# Patient Record
Sex: MALE | Race: WHITE | Employment: OTHER | URBAN - METROPOLITAN AREA
[De-identification: names, ages, dates, MRNs, and addresses within clinical notes are randomized per-mention and may not be internally consistent; named-entity substitution may affect disease eponyms.]

---

## 2017-02-17 ENCOUNTER — OFFICE VISIT (OUTPATIENT)
Dept: OTOLARYNGOLOGY | Facility: CLINIC | Age: 62
End: 2017-02-17

## 2017-02-17 VITALS — SYSTOLIC BLOOD PRESSURE: 138 MMHG | HEART RATE: 93 BPM | DIASTOLIC BLOOD PRESSURE: 75 MMHG

## 2017-02-17 DIAGNOSIS — J01.91 ACUTE RECURRENT SINUSITIS, UNSPECIFIED LOCATION: Primary | ICD-10-CM

## 2017-02-17 PROCEDURE — 99212 OFFICE O/P EST SF 10 MIN: CPT | Performed by: OTOLARYNGOLOGY

## 2017-02-17 PROCEDURE — 99213 OFFICE O/P EST LOW 20 MIN: CPT | Performed by: OTOLARYNGOLOGY

## 2017-02-17 RX ORDER — CEFDINIR 300 MG/1
300 CAPSULE ORAL 2 TIMES DAILY
Qty: 20 CAPSULE | Refills: 0 | Status: SHIPPED | OUTPATIENT
Start: 2017-02-17 | End: 2017-03-07

## 2017-02-17 NOTE — PROGRESS NOTES
Huma Dang is a 63year old male. Patient presents with:  Sinus Problem: congestion,   Cough: chronic cough since January , productive -yellow in color     HPI:   He's had problems with her cough and congestion for the last 4 weeks.  He had a course of shortness of breath with exertion  NEURO: denies headaches    EXAM:   /75 mmHg  Pulse 93  System Findings Details   Skin Normal Inspection - Normal.   Constitutional Normal Overall appearance - Normal.   Head/Face Normal Facial features - Normal. Eye

## 2017-02-17 NOTE — PATIENT INSTRUCTIONS
Acute Sinusitis    Acute sinusitis is irritation and swelling of the sinuses. It is usually caused by a viral infection after a common cold. Your doctor can help you find relief. What is acute sinusitis?   Sinuses are air-filled spaces in the skull behin © 1856-3208 [address], [address] [place]. All rights reserved. This information is not intended as a substitute for professional medical care. Always follow your healthcare professional's instructions.

## 2017-02-28 ENCOUNTER — TELEPHONE (OUTPATIENT)
Dept: OTOLARYNGOLOGY | Facility: CLINIC | Age: 62
End: 2017-02-28

## 2017-02-28 NOTE — TELEPHONE ENCOUNTER
Pt's LOV 2/17, acute recurrent sinusitis. Per pt, he completed Cefdinir; he is not coughing up as much yellow phlegm, cough has improved, but throat is really raw, has used Cepacol throat lozenges; cannot wear CPAP machine.   Pt wondering what he should do

## 2017-02-28 NOTE — TELEPHONE ENCOUNTER
Pt. States that he finished taking his antibiotic this past Sunday 2/26, but pt. Continues to have a sore throat, which is worse now and he also is coughing. Pt. Wants to know what to do?

## 2017-03-01 NOTE — TELEPHONE ENCOUNTER
It may take some time for him to clear the irritation in his throat. as we discussed when he was in the office I suspect that at least a part of his symptoms may be viral.I don't think any more antibiotics will be helpful. I would recommend gargling with sal

## 2017-03-07 ENCOUNTER — TELEPHONE (OUTPATIENT)
Dept: OTOLARYNGOLOGY | Facility: CLINIC | Age: 62
End: 2017-03-07

## 2017-03-07 RX ORDER — CEFDINIR 300 MG/1
300 CAPSULE ORAL 2 TIMES DAILY
Qty: 20 CAPSULE | Refills: 0 | Status: SHIPPED | OUTPATIENT
Start: 2017-03-07 | End: 2017-03-17

## 2017-03-07 NOTE — TELEPHONE ENCOUNTER
See TE from 2/28. Pt states he still feels the same, states he felt much better when he was prescribed antibiotics, states he has used OTC rx with little improvement. Pt asking if he can get antibiotics prescribed? Pls advise thank you.

## 2017-03-27 ENCOUNTER — TELEPHONE (OUTPATIENT)
Dept: OTOLARYNGOLOGY | Facility: CLINIC | Age: 62
End: 2017-03-27

## 2017-03-27 NOTE — TELEPHONE ENCOUNTER
Patient states that has pain in throat. States is having trouble swallowing and eating. Would like to speak with RN. Has appt scheduled on 03/31/17.  Thank you

## 2017-03-27 NOTE — TELEPHONE ENCOUNTER
Pt asking if he can be seen sooner, pt scheduled for tomorrow with  as pt dose not want to wait until Friday.

## 2017-03-28 ENCOUNTER — OFFICE VISIT (OUTPATIENT)
Dept: OTOLARYNGOLOGY | Facility: CLINIC | Age: 62
End: 2017-03-28

## 2017-03-28 VITALS
BODY MASS INDEX: 35.25 KG/M2 | SYSTOLIC BLOOD PRESSURE: 122 MMHG | HEIGHT: 69 IN | WEIGHT: 238 LBS | DIASTOLIC BLOOD PRESSURE: 80 MMHG | TEMPERATURE: 98 F

## 2017-03-28 DIAGNOSIS — J02.9 SORE THROAT: Primary | ICD-10-CM

## 2017-03-28 PROCEDURE — 99213 OFFICE O/P EST LOW 20 MIN: CPT | Performed by: OTOLARYNGOLOGY

## 2017-03-28 PROCEDURE — 99212 OFFICE O/P EST SF 10 MIN: CPT | Performed by: OTOLARYNGOLOGY

## 2017-03-28 RX ORDER — MONTELUKAST SODIUM 10 MG/1
10 TABLET ORAL NIGHTLY
Qty: 30 TABLET | Refills: 3 | Status: SHIPPED | OUTPATIENT
Start: 2017-03-28 | End: 2017-08-19

## 2017-03-28 RX ORDER — FLUTICASONE PROPIONATE 50 MCG
1 SPRAY, SUSPENSION (ML) NASAL 2 TIMES DAILY
Qty: 1 BOTTLE | Refills: 3 | Status: SHIPPED | OUTPATIENT
Start: 2017-03-28 | End: 2017-12-28 | Stop reason: ALTCHOICE

## 2017-03-28 RX ORDER — AMOXICILLIN AND CLAVULANATE POTASSIUM 875; 125 MG/1; MG/1
1 TABLET, FILM COATED ORAL EVERY 12 HOURS
Qty: 20 TABLET | Refills: 0 | Status: SHIPPED | OUTPATIENT
Start: 2017-03-28 | End: 2017-09-11

## 2017-03-28 RX ORDER — LAMOTRIGINE 100 MG/1
100 TABLET ORAL
Refills: 0 | COMMUNITY
Start: 2017-03-07 | End: 2017-09-11

## 2017-04-11 NOTE — PROGRESS NOTES
Jens Gaitan is a 63year old male. Patient presents with:  Sore Throat: since Friday      HISTORY OF PRESENT ILLNESS    He presents with a history of signifiant throat pain since Friday of last week. No history of recurrent throat pain.  No GERD signs vision changes. Respiratory Negative Dyspnea and wheezing. Cardio Negative Chest pain, irregular heartbeat/palpitations and syncope. GI Negative Abdominal pain and diarrhea. Endocrine Negative Cold intolerance and heat intolerance.    Neuro Negative Bedtime, Disp: , Rfl: 0  •  Amoxicillin-Pot Clavulanate 875-125 MG Oral Tab, Take 1 tablet by mouth every 12 (twelve) hours. , Disp: 20 tablet, Rfl: 0  •  Montelukast Sodium 10 MG Oral Tab, Take 1 tablet (10 mg total) by mouth nightly., Disp: 30 tablet, Rfl

## 2017-04-18 ENCOUNTER — TELEPHONE (OUTPATIENT)
Dept: OTOLARYNGOLOGY | Facility: CLINIC | Age: 62
End: 2017-04-18

## 2017-04-18 RX ORDER — AMOXICILLIN AND CLAVULANATE POTASSIUM 875; 125 MG/1; MG/1
1 TABLET, FILM COATED ORAL EVERY 12 HOURS
Qty: 20 TABLET | Refills: 0 | Status: SHIPPED | OUTPATIENT
Start: 2017-04-18 | End: 2017-04-28

## 2017-04-18 NOTE — TELEPHONE ENCOUNTER
Pt's LOV 3/28/17, sore throat. Per pt, he is still taking nose spray, Claritin-D, and Singulair; felt better after course of Augmentin. As of last Friday, he started having congestion and drainage, now spitting up green phlegm.   Pt wondering if he may ha

## 2017-08-23 RX ORDER — MONTELUKAST SODIUM 10 MG/1
10 TABLET ORAL NIGHTLY
Qty: 30 TABLET | Refills: 3 | Status: SHIPPED | OUTPATIENT
Start: 2017-08-23 | End: 2017-12-28 | Stop reason: ALTCHOICE

## 2017-08-23 NOTE — TELEPHONE ENCOUNTER
LRF 03/28/17 #30 with 3 rf.   JDO please advise on refill request.      Recent Outpatient Visits            3 months ago JADYN (obstructive sleep apnea)    Lele Rosario MD    Office Visit    4 months ago So

## 2017-09-11 ENCOUNTER — OFFICE VISIT (OUTPATIENT)
Dept: FAMILY MEDICINE CLINIC | Facility: CLINIC | Age: 62
End: 2017-09-11

## 2017-09-11 VITALS
TEMPERATURE: 98 F | SYSTOLIC BLOOD PRESSURE: 126 MMHG | DIASTOLIC BLOOD PRESSURE: 77 MMHG | HEIGHT: 68.5 IN | WEIGHT: 251 LBS | BODY MASS INDEX: 37.6 KG/M2 | HEART RATE: 69 BPM | RESPIRATION RATE: 20 BRPM

## 2017-09-11 DIAGNOSIS — F31.9 BIPOLAR AFFECTIVE DISORDER, REMISSION STATUS UNSPECIFIED (HCC): ICD-10-CM

## 2017-09-11 DIAGNOSIS — B35.6 TINEA CRURIS: ICD-10-CM

## 2017-09-11 DIAGNOSIS — C85.90 LYMPHOMA, UNSPECIFIED BODY REGION, UNSPECIFIED LYMPHOMA TYPE (HCC): ICD-10-CM

## 2017-09-11 DIAGNOSIS — L71.9 ROSACEA: ICD-10-CM

## 2017-09-11 PROCEDURE — 99202 OFFICE O/P NEW SF 15 MIN: CPT | Performed by: FAMILY MEDICINE

## 2017-09-11 PROCEDURE — 99212 OFFICE O/P EST SF 10 MIN: CPT | Performed by: FAMILY MEDICINE

## 2017-09-11 RX ORDER — LAMOTRIGINE 150 MG/1
TABLET ORAL
Refills: 0 | COMMUNITY
Start: 2017-08-29 | End: 2017-12-28

## 2017-09-11 RX ORDER — AMOXICILLIN AND CLAVULANATE POTASSIUM 875; 125 MG/1; MG/1
1 TABLET, FILM COATED ORAL EVERY 12 HOURS
Qty: 20 TABLET | Refills: 0 | Status: SHIPPED | OUTPATIENT
Start: 2017-09-11 | End: 2017-10-28 | Stop reason: ALTCHOICE

## 2017-09-11 NOTE — PROGRESS NOTES
HPI:    Patient ID: Shukri Donohue is a 63year old male. Pt presents to [facility] care - was former patient of Dr Jesus Salas. Has hx of lymphoma and bipolar disorder. Has had blood work done with his oncologist which was unremarkable and stable.      Pt has Constitutional: He appears well-developed and well-nourished. Cardiovascular: Normal rate, regular rhythm, normal heart sounds and intact distal pulses. Pulmonary/Chest: Effort normal and breath sounds normal. No respiratory distress.  He has no whee

## 2017-09-19 ENCOUNTER — OFFICE VISIT (OUTPATIENT)
Dept: OTOLARYNGOLOGY | Facility: CLINIC | Age: 62
End: 2017-09-19

## 2017-09-19 VITALS
HEIGHT: 68.25 IN | SYSTOLIC BLOOD PRESSURE: 138 MMHG | TEMPERATURE: 98 F | DIASTOLIC BLOOD PRESSURE: 76 MMHG | WEIGHT: 245 LBS | BODY MASS INDEX: 37.13 KG/M2

## 2017-09-19 DIAGNOSIS — H60.391 OTHER INFECTIVE ACUTE OTITIS EXTERNA OF RIGHT EAR: Primary | ICD-10-CM

## 2017-09-19 PROCEDURE — 99213 OFFICE O/P EST LOW 20 MIN: CPT | Performed by: OTOLARYNGOLOGY

## 2017-09-19 PROCEDURE — 99212 OFFICE O/P EST SF 10 MIN: CPT | Performed by: OTOLARYNGOLOGY

## 2017-09-19 RX ORDER — CLOTRIMAZOLE 1 G/ML
SOLUTION TOPICAL
Qty: 1 BOTTLE | Refills: 0 | Status: SHIPPED | OUTPATIENT
Start: 2017-09-19 | End: 2017-12-28 | Stop reason: ALTCHOICE

## 2017-09-26 NOTE — PROGRESS NOTES
Felix Manning is a 63year old male. Patient presents with:  Ear Problem: right ear difficult hearing for past 5 days, now hearing ok     HPI:   He had pain in his right ear and had difficulty hearing for the last few days.  Yesterday it seemed to improve Packs/day: 1.00      Years: 33.00        Types: Cigarettes     Start date: 11/9/1970     Quit date: 6/1/2015  Smokeless tobacco: Never Used                      Comment: quit on and off for 10 yrs.  debbie ear. Prescription given for clotrimazole drops. Return as needed. The patient indicates understanding of these issues and agrees to the plan. Ac Perez MD  9/26/2017  2:16 PM

## 2017-10-28 ENCOUNTER — OFFICE VISIT (OUTPATIENT)
Dept: OTOLARYNGOLOGY | Facility: CLINIC | Age: 62
End: 2017-10-28

## 2017-10-28 VITALS — HEIGHT: 68.5 IN | TEMPERATURE: 99 F | BODY MASS INDEX: 37.46 KG/M2 | WEIGHT: 250 LBS

## 2017-10-28 DIAGNOSIS — J01.91 ACUTE RECURRENT SINUSITIS, UNSPECIFIED LOCATION: Primary | ICD-10-CM

## 2017-10-28 PROCEDURE — 99213 OFFICE O/P EST LOW 20 MIN: CPT | Performed by: OTOLARYNGOLOGY

## 2017-10-28 PROCEDURE — 99212 OFFICE O/P EST SF 10 MIN: CPT | Performed by: OTOLARYNGOLOGY

## 2017-10-28 RX ORDER — AMOXICILLIN AND CLAVULANATE POTASSIUM 875; 125 MG/1; MG/1
1 TABLET, FILM COATED ORAL EVERY 12 HOURS
Qty: 28 TABLET | Refills: 1 | Status: SHIPPED | OUTPATIENT
Start: 2017-10-28 | End: 2017-12-28 | Stop reason: ALTCHOICE

## 2017-10-28 RX ORDER — AMOXICILLIN AND CLAVULANATE POTASSIUM 875; 125 MG/1; MG/1
1 TABLET, FILM COATED ORAL EVERY 12 HOURS
Qty: 28 TABLET | Refills: 0 | Status: SHIPPED | OUTPATIENT
Start: 2017-10-28 | End: 2017-10-28

## 2017-10-28 NOTE — PROGRESS NOTES
Miki Travis is a 63year old male. Patient presents with:  Sinus Problem: Recurrent sinus infections      HISTORY OF PRESENT ILLNESS  10/28/2017  Patient prevents for evaluation of sinusitis.   He feels terrible he has had for day history of facial pre apnea 11/20/16-Split    AHI 31 RDI 34 REM AHI 36 Supine AHI 98 non-supine AHI 17 Sao2 Nithin 81%/CPAP-8cwp/[facility]       Past Surgical History:  2000: OTHER SURGICAL HISTORY      Comment: Nose surgery      REVIEW OF SYSTEMS    System Neg/Pos Details   Constit turbinate hypertrophy bilaterally mucosa congestion. Polyps are not noted in neither nasal cavity       Current Outpatient Prescriptions:   •  Amoxicillin-Pot Clavulanate 875-125 MG Oral Tab, Take 1 tablet by mouth every 12 (twelve) hours. , Disp: 28 tablet get better and then I would call in some steroids in addition to this.         Gracie Christiansen MD

## 2017-11-09 ENCOUNTER — TELEPHONE (OUTPATIENT)
Dept: OTOLARYNGOLOGY | Facility: CLINIC | Age: 62
End: 2017-11-09

## 2017-11-09 NOTE — TELEPHONE ENCOUNTER
pt called. LOV 10/28/17 with Scar Rosario. Was RX antibiotics, only rec'd 14 pills, He recalls Dr. Tanmay Kearney stating he should get 20 pills. pt has 3 pills left of the 14. Please call.

## 2017-11-09 NOTE — TELEPHONE ENCOUNTER
Pt's LOV 10/28/17, acute recurrent sinusitis. Pt still taking Augmentin; cond'n has improved but pt states he is \"not completely better\"; still has pressure, congestion, PND. Pt not taking allergy meds; using Augmentin, Flonase, saline spray.   Pt is we

## 2017-12-23 ENCOUNTER — TELEPHONE (OUTPATIENT)
Dept: OTOLARYNGOLOGY | Facility: CLINIC | Age: 62
End: 2017-12-23

## 2017-12-23 RX ORDER — AMOXICILLIN AND CLAVULANATE POTASSIUM 875; 125 MG/1; MG/1
1 TABLET, FILM COATED ORAL EVERY 12 HOURS
Qty: 20 TABLET | Refills: 0 | Status: SHIPPED | OUTPATIENT
Start: 2017-12-23 | End: 2017-12-28 | Stop reason: ALTCHOICE

## 2017-12-23 NOTE — TELEPHONE ENCOUNTER
Pt states that he is experiencing a sinus infection. Would like to know if there is a medication that  can prescribe. Please advice. Thank you.

## 2017-12-23 NOTE — TELEPHONE ENCOUNTER
Pt states he has a sinus congestion; spitting out green material; has been experiencing this for the past week and a half. Pt currently taking Flonase. Per , okay for pt to have Rx of Augmentin 875 BID x 10 days.   Pt informed Rx has been sent to [facility]

## 2017-12-28 ENCOUNTER — OFFICE VISIT (OUTPATIENT)
Dept: INTERNAL MEDICINE CLINIC | Facility: CLINIC | Age: 62
End: 2017-12-28

## 2017-12-28 VITALS
SYSTOLIC BLOOD PRESSURE: 146 MMHG | TEMPERATURE: 99 F | HEART RATE: 84 BPM | WEIGHT: 258.5 LBS | OXYGEN SATURATION: 96 % | BODY MASS INDEX: 38.73 KG/M2 | DIASTOLIC BLOOD PRESSURE: 84 MMHG | HEIGHT: 68.5 IN

## 2017-12-28 DIAGNOSIS — R05.9 COUGH: ICD-10-CM

## 2017-12-28 DIAGNOSIS — J32.0 CHRONIC MAXILLARY SINUSITIS: Primary | ICD-10-CM

## 2017-12-28 DIAGNOSIS — F31.62 BIPOLAR DISORDER, CURRENT EPISODE MIXED, MODERATE (HCC): ICD-10-CM

## 2017-12-28 DIAGNOSIS — B36.9 DERMATOMYCOSIS: ICD-10-CM

## 2017-12-28 DIAGNOSIS — L40.50 PSORIATIC ARTHROPATHY (HCC): ICD-10-CM

## 2017-12-28 DIAGNOSIS — B00.1 RECURRENT COLD SORES: ICD-10-CM

## 2017-12-28 DIAGNOSIS — C91.10 CLL (CHRONIC LYMPHOCYTIC LEUKEMIA) (HCC): ICD-10-CM

## 2017-12-28 DIAGNOSIS — N40.0 BENIGN PROSTATIC HYPERPLASIA, UNSPECIFIED WHETHER LOWER URINARY TRACT SYMPTOMS PRESENT: ICD-10-CM

## 2017-12-28 DIAGNOSIS — G47.33 OBSTRUCTIVE SLEEP APNEA SYNDROME: ICD-10-CM

## 2017-12-28 PROBLEM — G47.30 SLEEP APNEA: Status: ACTIVE | Noted: 2017-12-28

## 2017-12-28 PROCEDURE — 99204 OFFICE O/P NEW MOD 45 MIN: CPT | Performed by: INTERNAL MEDICINE

## 2017-12-28 PROCEDURE — 99212 OFFICE O/P EST SF 10 MIN: CPT | Performed by: INTERNAL MEDICINE

## 2017-12-28 RX ORDER — CLOTRIMAZOLE AND BETAMETHASONE DIPROPIONATE 10; .64 MG/G; MG/G
CREAM TOPICAL
Qty: 60 G | Refills: 1 | Status: SHIPPED | OUTPATIENT
Start: 2017-12-28 | End: 2018-05-03

## 2017-12-28 RX ORDER — PREDNISONE 1 MG/1
TABLET ORAL
Qty: 15 TABLET | Refills: 0 | Status: SHIPPED | OUTPATIENT
Start: 2017-12-28 | End: 2018-03-08 | Stop reason: ALTCHOICE

## 2017-12-28 RX ORDER — LAMOTRIGINE 150 MG/1
TABLET ORAL
Qty: 30 TABLET | Refills: 2 | Status: SHIPPED | OUTPATIENT
Start: 2017-12-28 | End: 2021-02-26

## 2017-12-28 RX ORDER — FLUCONAZOLE 150 MG/1
TABLET ORAL
Qty: 3 TABLET | Refills: 0 | Status: SHIPPED | OUTPATIENT
Start: 2017-12-28 | End: 2018-03-08 | Stop reason: ALTCHOICE

## 2017-12-28 RX ORDER — AMOXICILLIN 875 MG/1
875 TABLET, COATED ORAL 2 TIMES DAILY
COMMUNITY
End: 2018-01-04 | Stop reason: ALTCHOICE

## 2017-12-29 NOTE — ASSESSMENT & PLAN NOTE
Patient has been stable on finasteride and tamsulosin along with Cialis which he has tolerated well.   Continue the same but patient requests transfer of urology to [facility] advised to contact urology at [phone] for an appointment

## 2017-12-29 NOTE — PATIENT INSTRUCTIONS
Problem List Items Addressed This Visit        Unprioritized    Bipolar disorder, current episode mixed, moderate (HCC)     Chronic bipolar disorder and has been on lamotrigine.   He would like a refill on his medication–150 mg daily as well as a referral t ibrutinib (IMBRUVICA) 140 MG Oral Cap    predniSONE 5 MG Oral Tab    lamoTRIgine 150 MG Oral Tab    Other Relevant Orders    XR CHEST PA + LAT CHEST (CPT=71020)    CBC WITH DIFFERENTIAL WITH PLATELET    COMP METABOLIC PANEL (14)    Dermatomycosis     Sever

## 2017-12-29 NOTE — ASSESSMENT & PLAN NOTE
Patient is managed per rheumatology at [facility]. He has been on physical therapy and would like to transfer that to [place]–would advised to contact the numbers provided here.   He is on ibuprofen for as needed use and it is thought that his current t

## 2017-12-29 NOTE — ASSESSMENT & PLAN NOTE
Severe bilateral groin dermatomycosis with erythema, peeling and discomfort. Diflucan 150 mg 1 tablet once a week for the next 3 weeks and local application of Lotrisone as directed.

## 2017-12-29 NOTE — ASSESSMENT & PLAN NOTE
Chronic bipolar disorder and has been on lamotrigine. He would like a refill on his medication–150 mg daily as well as a referral to a psychiatrist.  Referral to [facility] behavioral navigator has been provided and will follow up.

## 2017-12-29 NOTE — PROGRESS NOTES
HPI:    Patient ID: Saloni Hernandez is a 63year old male. New pt,transfer of care from Dr Yeimy Marie.     Per records from [facility] as follows    SUBJECTIVE:    Scar Cohen is a 61y/o male with CLL, diagnosed in 2009, when he was noted to have lymphadeno physical activity   Denies c/o fevers, drenching night sweats, weight loss, pruritis. Denies c/o SOB at rest, CP, palpitations, N/V/C/D, rectal bleeding, melena, abdominal pain or bloating, dysuria, hematuria.  Denies peripheral neuropathy or lower extremit Associated symptoms include chills, congestion, coughing, headaches, shortness of breath, sinus pressure, sneezing and swollen glands. (Ct sinuses  IMPRESSION:    1.  Moderate to marked mucosal changes in the right maxillary sinus, with mucoid secretions.   34 REM AHI 36 Supine AHI 98 non-supine AHI 17 Sao2 Nithin 81%/CPAP-8cwp/[facility]         Family History   Problem Relation Age of Onset   • Breast Cancer Mother    • Other [OTHER] Father      aneurysm   • Prostate Cancer Brother    • Cancer Brother      Lung C once daily. Disp:  Rfl: 3   tamsulosin HCl (FLOMAX) 0.4 MG Oral Cap 0.4 mg daily. Disp:  Rfl:    finasteride (PROSCAR) 5 MG Oral Tab Take 5 mg by mouth daily.    Disp:  Rfl:    Loratadine-Pseudoephedrine ER (CLARITIN-D 24 HOUR)  MG Oral Tablet 24 Hr would like a refill on his medication–150 mg daily as well as a referral to a psychiatrist.  Referral to [facility] behavioral navigator has been provided and will follow up.          Relevant Medications    lamoTRIgine 150 MG Oral Tab    Other Relevant Or (Completed)    Dermatomycosis     Severe bilateral groin dermatomycosis with erythema, peeling and discomfort. Diflucan 150 mg 1 tablet once a week for the next 3 weeks and local application of Lotrisone as directed.          Psoriatic arthropathy ([facility] [address]

## 2017-12-29 NOTE — ASSESSMENT & PLAN NOTE
Advised to complete Augmentin. Start on Claritin over-the-counter or Zyrtec over-the-counter–10 mg 1 tablet once daily. Continue on Flonase 1 puff each nostril 2 times daily. Consider repeat CAT scan of the sinuses to evaluate for an abscess.   Last CAT

## 2017-12-29 NOTE — ASSESSMENT & PLAN NOTE
Patient is currently on Ibrutinib for his CLL which he seems to have tolerated well. He is being managed per [place]ology [place] [facility], will follow up with further care. He is suffering from recurrent sinus infections as well as oral cold sores.   He may b

## 2018-01-04 ENCOUNTER — LAB ENCOUNTER (OUTPATIENT)
Dept: LAB | Facility: HOSPITAL | Age: 63
End: 2018-01-04
Attending: INTERNAL MEDICINE
Payer: MEDICARE

## 2018-01-04 ENCOUNTER — OFFICE VISIT (OUTPATIENT)
Dept: OTOLARYNGOLOGY | Facility: CLINIC | Age: 63
End: 2018-01-04

## 2018-01-04 VITALS
DIASTOLIC BLOOD PRESSURE: 97 MMHG | BODY MASS INDEX: 37.46 KG/M2 | SYSTOLIC BLOOD PRESSURE: 153 MMHG | HEIGHT: 68.5 IN | WEIGHT: 250 LBS | TEMPERATURE: 98 F

## 2018-01-04 DIAGNOSIS — J01.91 ACUTE RECURRENT SINUSITIS, UNSPECIFIED LOCATION: Primary | ICD-10-CM

## 2018-01-04 DIAGNOSIS — C91.10 CLL (CHRONIC LYMPHOCYTIC LEUKEMIA) (HCC): ICD-10-CM

## 2018-01-04 LAB
ALBUMIN SERPL BCP-MCNC: 4.4 G/DL (ref 3.5–4.8)
ALBUMIN/GLOB SERPL: 2 {RATIO} (ref 1–2)
ALP SERPL-CCNC: 58 U/L (ref 32–100)
ALT SERPL-CCNC: 39 U/L (ref 17–63)
ANION GAP SERPL CALC-SCNC: 8 MMOL/L (ref 0–18)
AST SERPL-CCNC: 23 U/L (ref 15–41)
BASOPHILS # BLD: 0 K/UL (ref 0–0.2)
BASOPHILS NFR BLD: 0 %
BILIRUB SERPL-MCNC: 0.8 MG/DL (ref 0.3–1.2)
BUN SERPL-MCNC: 10 MG/DL (ref 8–20)
BUN/CREAT SERPL: 14.7 (ref 10–20)
CALCIUM SERPL-MCNC: 9 MG/DL (ref 8.5–10.5)
CHLORIDE SERPL-SCNC: 101 MMOL/L (ref 95–110)
CO2 SERPL-SCNC: 30 MMOL/L (ref 22–32)
CREAT SERPL-MCNC: 0.68 MG/DL (ref 0.5–1.5)
EOSINOPHIL # BLD: 0 K/UL (ref 0–0.7)
EOSINOPHIL NFR BLD: 0 %
ERYTHROCYTE [DISTWIDTH] IN BLOOD BY AUTOMATED COUNT: 14.5 % (ref 11–15)
GLOBULIN PLAS-MCNC: 2.2 G/DL (ref 2.5–3.7)
GLUCOSE SERPL-MCNC: 107 MG/DL (ref 70–99)
HCT VFR BLD AUTO: 45.9 % (ref 41–52)
HGB BLD-MCNC: 15.2 G/DL (ref 13.5–17.5)
LYMPHOCYTES # BLD: 2.9 K/UL (ref 1–4)
LYMPHOCYTES NFR BLD: 36 %
MCH RBC QN AUTO: 30 PG (ref 27–32)
MCHC RBC AUTO-ENTMCNC: 33.2 G/DL (ref 32–37)
MCV RBC AUTO: 90.4 FL (ref 80–100)
MONOCYTES # BLD: 0.6 K/UL (ref 0–1)
MONOCYTES NFR BLD: 8 %
NEUTROPHILS # BLD AUTO: 4.5 K/UL (ref 1.8–7.7)
NEUTROPHILS NFR BLD: 56 %
OSMOLALITY UR CALC.SUM OF ELEC: 288 MOSM/KG (ref 275–295)
PLATELET # BLD AUTO: 168 K/UL (ref 140–400)
PMV BLD AUTO: 7.6 FL (ref 7.4–10.3)
POTASSIUM SERPL-SCNC: 4.1 MMOL/L (ref 3.3–5.1)
PROT SERPL-MCNC: 6.6 G/DL (ref 5.9–8.4)
RBC # BLD AUTO: 5.08 M/UL (ref 4.5–5.9)
SODIUM SERPL-SCNC: 139 MMOL/L (ref 136–144)
WBC # BLD AUTO: 8.1 K/UL (ref 4–11)

## 2018-01-04 PROCEDURE — 80053 COMPREHEN METABOLIC PANEL: CPT

## 2018-01-04 PROCEDURE — 99212 OFFICE O/P EST SF 10 MIN: CPT | Performed by: OTOLARYNGOLOGY

## 2018-01-04 PROCEDURE — 99214 OFFICE O/P EST MOD 30 MIN: CPT | Performed by: OTOLARYNGOLOGY

## 2018-01-04 PROCEDURE — 36415 COLL VENOUS BLD VENIPUNCTURE: CPT

## 2018-01-04 PROCEDURE — 85025 COMPLETE CBC W/AUTO DIFF WBC: CPT

## 2018-01-04 RX ORDER — FINASTERIDE 5 MG/1
TABLET, FILM COATED ORAL
COMMUNITY
Start: 2017-09-22 | End: 2018-01-04

## 2018-01-04 RX ORDER — LORATADINE AND PSEUDOEPHEDRINE 10; 240 MG/1; MG/1
1 TABLET, EXTENDED RELEASE ORAL DAILY
COMMUNITY
End: 2018-02-06 | Stop reason: ALTCHOICE

## 2018-01-04 RX ORDER — TAMSULOSIN HYDROCHLORIDE 0.4 MG/1
CAPSULE ORAL
COMMUNITY
Start: 2017-07-31 | End: 2018-01-04

## 2018-01-04 RX ORDER — AMOXICILLIN AND CLAVULANATE POTASSIUM 875; 125 MG/1; MG/1
1 TABLET, FILM COATED ORAL EVERY 12 HOURS
Qty: 20 TABLET | Refills: 1 | Status: SHIPPED | OUTPATIENT
Start: 2018-01-04 | End: 2018-07-20 | Stop reason: ALTCHOICE

## 2018-01-04 RX ORDER — LAMOTRIGINE 150 MG/1
TABLET ORAL
COMMUNITY
End: 2018-01-04

## 2018-01-04 NOTE — PROGRESS NOTES
Lynette Frausto is a 63year old male.   Patient presents with:  Ear Problem: blocked right ear for over 1 week, pt finished antibiotics yesterday   Sinus Problem: pt had a sinus infection       HISTORY OF PRESENT ILLNESS  10/28/2017  Patient alycia for gadiel Cancer Mother    • Other [place] Father      aneurysm   • Prostate Cancer Brother    • Cancer Brother      Lung Cancer - smoker     Past Medical History:   Diagnosis Date   • Arthritis    • Bipolar affective ([address]    • BPH (benign prostatic hyperplasia) XII grossly intact.  Gait is normal   Head/Face Normal Facial features - Normal. Eyebrows - Normal. Skull - Normal.             Ears Normal Inspection - Right: Normal, Left: Normal. Canal - Right: Normal, Left: Normal. TM - Right:manju Left: Normal.     Skin to the season. Patient was instructed to call if symptoms do not resolve. Commended 2 weeks of Augmentin consider refilling it he is not completely better.   Consider myringotomy for manju if this does not resolve      Hermelindo Cherry MD

## 2018-01-08 ENCOUNTER — HOSPITAL ENCOUNTER (OUTPATIENT)
Dept: GENERAL RADIOLOGY | Facility: HOSPITAL | Age: 63
Discharge: HOME | End: 2018-01-08
Attending: INTERNAL MEDICINE
Payer: MEDICARE

## 2018-01-08 DIAGNOSIS — R05.9 COUGH: ICD-10-CM

## 2018-01-08 DIAGNOSIS — J32.0 CHRONIC MAXILLARY SINUSITIS: ICD-10-CM

## 2018-01-08 DIAGNOSIS — C91.10 CLL (CHRONIC LYMPHOCYTIC LEUKEMIA) (HCC): ICD-10-CM

## 2018-01-08 PROCEDURE — 71046 X-RAY EXAM CHEST 2 VIEWS: CPT | Performed by: INTERNAL MEDICINE

## 2018-01-30 ENCOUNTER — TELEPHONE (OUTPATIENT)
Dept: OTOLARYNGOLOGY | Facility: CLINIC | Age: 63
End: 2018-01-30

## 2018-01-30 DIAGNOSIS — J32.9 CHRONIC SINUSITIS, UNSPECIFIED LOCATION: Primary | ICD-10-CM

## 2018-01-30 NOTE — TELEPHONE ENCOUNTER
Pt informed that Per  he recommends CT sinus (brain Lab). Prior authorization received # [id number] and expires March 16 2018.  Advised pt that prior authorization is not a guarantee of payment, pt to contact insurance to verify benefits and out of

## 2018-01-30 NOTE — TELEPHONE ENCOUNTER
Per pt finished taking 20 day antibiotic on Friday. Pt states still feels like he has water in his ear, blowing yellow/green discharge, and sinus headache. Pt requesting a refill for medication.  Please advise

## 2018-02-04 ENCOUNTER — HOSPITAL ENCOUNTER (OUTPATIENT)
Dept: CT IMAGING | Facility: HOSPITAL | Age: 63
Discharge: HOME | End: 2018-02-04
Attending: OTOLARYNGOLOGY
Payer: MEDICARE

## 2018-02-04 DIAGNOSIS — J32.9 CHRONIC SINUSITIS, UNSPECIFIED LOCATION: ICD-10-CM

## 2018-02-04 PROCEDURE — 70486 CT MAXILLOFACIAL W/O DYE: CPT | Performed by: OTOLARYNGOLOGY

## 2018-02-05 ENCOUNTER — TELEPHONE (OUTPATIENT)
Dept: INTERNAL MEDICINE CLINIC | Facility: CLINIC | Age: 63
End: 2018-02-05

## 2018-02-05 PROBLEM — E66.01 SEVERE OBESITY (BMI 35.0-39.9) WITH COMORBIDITY (HCC): Chronic | Status: ACTIVE | Noted: 2018-02-05

## 2018-02-05 NOTE — TELEPHONE ENCOUNTER
Bre Milan pt will like to know his blood test results from 1/4 and his  Chest x ray result done on 1/8 . Thanks

## 2018-02-05 NOTE — TELEPHONE ENCOUNTER
Patient calling back-states he already spoke with ELMIRA-No further questions/concerns at this time.

## 2018-02-06 ENCOUNTER — APPOINTMENT (OUTPATIENT)
Dept: GENERAL RADIOLOGY | Facility: HOSPITAL | Age: 63
End: 2018-02-06
Attending: NURSE PRACTITIONER
Payer: MEDICARE

## 2018-02-06 ENCOUNTER — HOSPITAL ENCOUNTER (EMERGENCY)
Facility: HOSPITAL | Age: 63
Discharge: HOME | End: 2018-02-06
Payer: MEDICARE

## 2018-02-06 ENCOUNTER — APPOINTMENT (OUTPATIENT)
Dept: CT IMAGING | Facility: HOSPITAL | Age: 63
End: 2018-02-06
Attending: NURSE PRACTITIONER
Payer: MEDICARE

## 2018-02-06 ENCOUNTER — TELEPHONE (OUTPATIENT)
Dept: ADMINISTRATIVE | Age: 63
End: 2018-02-06

## 2018-02-06 ENCOUNTER — TELEPHONE (OUTPATIENT)
Dept: INTERNAL MEDICINE CLINIC | Facility: CLINIC | Age: 63
End: 2018-02-06

## 2018-02-06 VITALS
OXYGEN SATURATION: 95 % | BODY MASS INDEX: 37 KG/M2 | RESPIRATION RATE: 16 BRPM | DIASTOLIC BLOOD PRESSURE: 103 MMHG | HEART RATE: 75 BPM | SYSTOLIC BLOOD PRESSURE: 154 MMHG | WEIGHT: 250 LBS | TEMPERATURE: 98 F

## 2018-02-06 DIAGNOSIS — S16.1XXA NECK STRAIN, INITIAL ENCOUNTER: Primary | ICD-10-CM
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
## 2018-02-06 LAB
BACTERIA UR QL AUTO: NEGATIVE /HPF
BILIRUB UR QL: NEGATIVE
CLARITY UR: CLEAR
COLOR UR: YELLOW
GLUCOSE UR-MCNC: NEGATIVE MG/DL
HGB UR QL STRIP.AUTO: NEGATIVE
KETONES UR-MCNC: NEGATIVE MG/DL
NITRITE UR QL STRIP.AUTO: NEGATIVE
PH UR: 7 [PH] (ref 5–8)
PROT UR-MCNC: NEGATIVE MG/DL
RBC #/AREA URNS AUTO: <1 /HPF
SP GR UR STRIP: 1.01 (ref 1–1.03)
UROBILINOGEN UR STRIP-ACNC: <2
VIT C UR-MCNC: NEGATIVE MG/DL
WBC #/AREA URNS AUTO: 1 /HPF

## 2018-02-06 PROCEDURE — 71101 X-RAY EXAM UNILAT RIBS/CHEST: CPT | Performed by: NURSE PRACTITIONER

## 2018-02-06 PROCEDURE — 70450 CT HEAD/BRAIN W/O DYE: CPT | Performed by: NURSE PRACTITIONER

## 2018-02-06 PROCEDURE — 99284 EMERGENCY DEPT VISIT MOD MDM: CPT

## 2018-02-06 PROCEDURE — 81001 URINALYSIS AUTO W/SCOPE: CPT | Performed by: NURSE PRACTITIONER

## 2018-02-06 PROCEDURE — 72100 X-RAY EXAM L-S SPINE 2/3 VWS: CPT | Performed by: NURSE PRACTITIONER

## 2018-02-06 PROCEDURE — 72125 CT NECK SPINE W/O DYE: CPT | Performed by: NURSE PRACTITIONER

## 2018-02-06 RX ORDER — CETIRIZINE HYDROCHLORIDE 10 MG/1
10 TABLET ORAL DAILY
Qty: 30 TABLET | Refills: 3 | Status: SHIPPED | OUTPATIENT
Start: 2018-02-06 | End: 2018-07-20

## 2018-02-06 RX ORDER — MONTELUKAST SODIUM 10 MG/1
10 TABLET ORAL NIGHTLY
Qty: 30 TABLET | Refills: 3 | Status: SHIPPED | OUTPATIENT
Start: 2018-02-06 | End: 2018-06-07

## 2018-02-06 NOTE — TELEPHONE ENCOUNTER
Pt states that he spoke with Dr. Kishore Smith last night and she was going to send rxs to the pharm. Please advise on what rxs you would like sent.
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
## 2018-02-06 NOTE — TELEPHONE ENCOUNTER
i reviewed the chart-we spoke about the labs and i ordered them,i am sorry i do not remember med refills-please check with pt what he needs and have them refilled x 1-either 30 or 90 as may change after labs and eval
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
## 2018-02-06 NOTE — TELEPHONE ENCOUNTER
Pt states that he was told by  that he should not be taking Claritin D but that she would prescribe something else that he wouldn't have to pay for. Also, he was told she would send something else for his chronic sinusitis.   josie LEWIS is currently in the

## 2018-02-06 NOTE — ED PROVIDER NOTES
Patient Seen in: [facility] AND [facility] Emergency Department    History   CC: neck pain  HPI: Miles Jones 63year old male with hx of arthritis and CLL currently undergoing chemo treatment thru [facility] who presents to the ER c/o headache, left-sided neck pain • Cancer Brother      Lung Cancer - smoker       Smoking status: Former Smoker                                                              Packs/day: 1.00      Years: 33.00        Types: Cigarettes     Start date: 11/9/1970     Quit date: 6/1/2015  Smok 37.46 kg/m²         General - Appears well, non-toxic and in NAD  Head - Appears symmetrical without deformity/swelling cranium, scalp, or facial bones, no tenderness on palpation throughout, TMJ bilat without crepitus or limited ROM  Eyes - PERRL, sclera with general contusion/sprain/strain instructions and advised follow-up in the next 1-2 days with PCP. Return precautions reviewed. Patient demonstrates understanding of all instruction and agrees with plan of care.      Disposition and Plan     Clinical

## 2018-02-06 NOTE — TELEPHONE ENCOUNTER
Patient stated that he was given Flonase by Dr. Sangita Galvan and has been using it up until last 4 days, he stopped due to nosebleeds.  He said that he thought Dr. Qian Wakefield had mentioned some other medication he should take for his sinuses, but he couldn't remember detailed exam

## 2018-02-06 NOTE — TELEPHONE ENCOUNTER
Zyrtec–10 mg 1 tablet once daily, Flonase 1 puff each nostril 2 times daily–both for 30 days and 3 refills
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
## 2018-02-07 NOTE — TELEPHONE ENCOUNTER
[employer] Disability form for Dr. Mili Stoddard received in [facility]. Logged for processing. [employer] packet emaled to pt '[email or web address]'.  NK

## 2018-02-13 ENCOUNTER — NURSE TRIAGE (OUTPATIENT)
Dept: OTHER | Age: 63
End: 2018-02-13

## 2018-02-14 ENCOUNTER — OFFICE VISIT (OUTPATIENT)
Dept: ORTHOPEDICS CLINIC | Facility: CLINIC | Age: 63
End: 2018-02-14

## 2018-02-14 DIAGNOSIS — S80.02XA CONTUSION OF LEFT KNEE, INITIAL ENCOUNTER: Primary | ICD-10-CM

## 2018-02-14 PROCEDURE — 99243 OFF/OP CNSLTJ NEW/EST LOW 30: CPT | Performed by: ORTHOPAEDIC SURGERY

## 2018-02-14 PROCEDURE — 99212 OFFICE O/P EST SF 10 MIN: CPT | Performed by: ORTHOPAEDIC SURGERY

## 2018-02-14 NOTE — TELEPHONE ENCOUNTER
Pt called, message per Dr given.   Verbalized understanding and compliance  Chart to [facility] for referral determination
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
## 2018-02-24 ENCOUNTER — HOSPITAL ENCOUNTER (OUTPATIENT)
Age: 63
Discharge: HOME | End: 2018-02-24
Attending: FAMILY MEDICINE
Payer: MEDICAID

## 2018-02-24 ENCOUNTER — TELEPHONE (OUTPATIENT)
Dept: OTHER | Age: 63
End: 2018-02-24

## 2018-02-24 VITALS
DIASTOLIC BLOOD PRESSURE: 80 MMHG | TEMPERATURE: 98 F | WEIGHT: 250 LBS | HEIGHT: 68 IN | RESPIRATION RATE: 18 BRPM | SYSTOLIC BLOOD PRESSURE: 156 MMHG | HEART RATE: 79 BPM | BODY MASS INDEX: 37.89 KG/M2 | OXYGEN SATURATION: 99 %

## 2018-02-24 DIAGNOSIS — I10 HYPERTENSION, UNSPECIFIED TYPE: Primary | ICD-10-CM

## 2018-02-24 PROCEDURE — 99213 OFFICE O/P EST LOW 20 MIN: CPT

## 2018-02-24 PROCEDURE — 99212 OFFICE O/P EST SF 10 MIN: CPT

## 2018-02-24 NOTE — ED PROVIDER NOTES
Patient Seen in: [address]    History   Patient presents with:  Hypertension (cardiovascular)    Stated Complaint: \"hypertension\"    HPI    Sent here after noticing high blood pressure earlier today at health facility \"hypertension\"  Other systems are as noted in HPI. Constitutional and vital signs reviewed. All other systems reviewed and negative except as noted above.     Physical Exam   ED Triage Vitals [02/24/18 0946]  BP: (!) 163/78  Pulse: 79  Resp: 18  Tem systolic is slightly on the higher side of normal.  Diastolic normal.  May be increased blood pressure was noted due to a side effect of coffee. Recommended highly limit caffeinated products. Drink push p.o. fluids.   Maintain blood pressure diary and fol

## 2018-02-24 NOTE — TELEPHONE ENCOUNTER
Pt stts his BP this morning at counselors office is 192/100 (checked 3 times),has a slight headache,no dizziness,weakness ,slurred speech,chest pain or shortness of breath. . No hx of hypertension. No appts in the office today.  Pt advised to go to the urgen

## 2018-03-08 ENCOUNTER — OFFICE VISIT (OUTPATIENT)
Dept: INTERNAL MEDICINE CLINIC | Facility: CLINIC | Age: 63
End: 2018-03-08

## 2018-03-08 VITALS
HEIGHT: 68.5 IN | RESPIRATION RATE: 20 BRPM | WEIGHT: 256 LBS | SYSTOLIC BLOOD PRESSURE: 138 MMHG | TEMPERATURE: 98 F | BODY MASS INDEX: 38.35 KG/M2 | DIASTOLIC BLOOD PRESSURE: 83 MMHG | HEART RATE: 73 BPM

## 2018-03-08 DIAGNOSIS — J32.0 CHRONIC MAXILLARY SINUSITIS: Primary | ICD-10-CM

## 2018-03-08 DIAGNOSIS — B00.2 ORAL HERPES SIMPLEX INFECTION: ICD-10-CM

## 2018-03-08 PROCEDURE — 99212 OFFICE O/P EST SF 10 MIN: CPT | Performed by: INTERNAL MEDICINE

## 2018-03-08 PROCEDURE — 99214 OFFICE O/P EST MOD 30 MIN: CPT | Performed by: INTERNAL MEDICINE

## 2018-03-08 RX ORDER — AMOXICILLIN AND CLAVULANATE POTASSIUM 875; 125 MG/1; MG/1
1 TABLET, FILM COATED ORAL 2 TIMES DAILY
Qty: 28 TABLET | Refills: 0 | Status: SHIPPED | OUTPATIENT
Start: 2018-03-08 | End: 2018-03-14

## 2018-03-08 RX ORDER — PREDNISONE 1 MG/1
TABLET ORAL
Qty: 15 TABLET | Refills: 0 | Status: SHIPPED | OUTPATIENT
Start: 2018-03-08 | End: 2018-07-20 | Stop reason: ALTCHOICE

## 2018-03-08 RX ORDER — VALACYCLOVIR HYDROCHLORIDE 1 G/1
TABLET, FILM COATED ORAL
Qty: 28 TABLET | Refills: 0 | Status: SHIPPED | OUTPATIENT
Start: 2018-03-08 | End: 2018-07-20

## 2018-03-09 NOTE — PROGRESS NOTES
HPI:    Patient ID: Rick Miller is a 63year old male.     Er visit 2/24    Clinical Impression:  Hypertension, unspecified type  (primary encounter diagnosis)      Recheck blood pressure systolic is slightly on the higher side of normal.  Diastolic nor cll.ct recently showed chronic sinusitis. seen by ent.) since onset. There has been no fever. His pain is at a severity of 4/10. The pain is moderate.  Associated symptoms include chills, congestion, headaches, sinus pressure, a sore throat and swollen gland Pulse: 73   Resp: 20   Temp: 97.9 °F (36.6 °C)     Body mass index is 38.36 kg/m². PHYSICAL EXAM:   Physical Exam   Constitutional: He is oriented to person, place, and time. He appears well-developed and well-nourished.    HENT:   Head: Normocephalic Tender lymph nodes in the neck. Treat with Valtrex 1000 mgs p.o. twice daily for 2 weeks. Return in about 6 weeks (around 4/19/2018), or if symptoms worsen or fail to improve.     PT UNDERSTANDS AND AGREES TO FOLLOW DIRECTIONS AND ADVICE    N

## 2018-03-09 NOTE — PATIENT INSTRUCTIONS
Problem List Items Addressed This Visit        Unprioritized    Chronic maxillary sinusitis - Primary     Augmentin 875 mg p.o. twice daily for 2 weeks, prednisone 5 mg p.o. twice daily for 5 days and then daily for 5 days.   Continue Flonase as well as Zyr

## 2018-03-09 NOTE — ASSESSMENT & PLAN NOTE
Oral herpetic lesions along with a few lesions on his lip. Tender lymph nodes in the neck. Treat with Valtrex 1000 mgs p.o. twice daily for 2 weeks.

## 2018-03-09 NOTE — ASSESSMENT & PLAN NOTE
Augmentin 875 mg p.o. twice daily for 2 weeks, prednisone 5 mg p.o. twice daily for 5 days and then daily for 5 days. Continue Flonase as well as Zyrtec as directed. Continue on Singulair as directed.   Call in about 2 weeks for continuing on maintenance

## 2018-03-13 NOTE — TELEPHONE ENCOUNTER
From [employer] via dept 03/05 - Form for Dr. Aguilar Leo, recs update only faxed to [address] 03/08, scanned, copy mailed to pt, n/c. NK

## 2018-03-20 ENCOUNTER — OFFICE VISIT (OUTPATIENT)
Dept: INTERNAL MEDICINE CLINIC | Facility: CLINIC | Age: 63
End: 2018-03-20

## 2018-03-20 VITALS
SYSTOLIC BLOOD PRESSURE: 140 MMHG | TEMPERATURE: 98 F | DIASTOLIC BLOOD PRESSURE: 80 MMHG | HEIGHT: 68.5 IN | BODY MASS INDEX: 37.9 KG/M2 | WEIGHT: 253 LBS | HEART RATE: 71 BPM

## 2018-03-20 DIAGNOSIS — Z00.00 ROUTINE PHYSICAL EXAMINATION: ICD-10-CM

## 2018-03-20 DIAGNOSIS — N40.0 BENIGN PROSTATIC HYPERPLASIA, UNSPECIFIED WHETHER LOWER URINARY TRACT SYMPTOMS PRESENT: ICD-10-CM

## 2018-03-20 DIAGNOSIS — J32.0 CHRONIC MAXILLARY SINUSITIS: ICD-10-CM

## 2018-03-20 DIAGNOSIS — C91.10 CLL (CHRONIC LYMPHOCYTIC LEUKEMIA) (HCC): ICD-10-CM

## 2018-03-20 DIAGNOSIS — B00.2 ORAL HERPES SIMPLEX INFECTION: Primary | ICD-10-CM

## 2018-03-20 DIAGNOSIS — E66.01 SEVERE OBESITY (BMI 35.0-39.9) WITH COMORBIDITY (HCC): Chronic | ICD-10-CM

## 2018-03-20 DIAGNOSIS — G47.33 OBSTRUCTIVE SLEEP APNEA SYNDROME: ICD-10-CM

## 2018-03-20 DIAGNOSIS — L40.50 PSORIATIC ARTHROPATHY (HCC): ICD-10-CM

## 2018-03-20 DIAGNOSIS — F31.62 BIPOLAR DISORDER, CURRENT EPISODE MIXED, MODERATE (HCC): ICD-10-CM

## 2018-03-20 DIAGNOSIS — I10 ESSENTIAL HYPERTENSION: ICD-10-CM

## 2018-03-20 PROCEDURE — 99215 OFFICE O/P EST HI 40 MIN: CPT | Performed by: INTERNAL MEDICINE

## 2018-03-20 PROCEDURE — 99212 OFFICE O/P EST SF 10 MIN: CPT | Performed by: INTERNAL MEDICINE

## 2018-03-20 RX ORDER — SULFAMETHOXAZOLE AND TRIMETHOPRIM 800; 160 MG/1; MG/1
TABLET ORAL
Qty: 30 TABLET | Refills: 0 | Status: SHIPPED | OUTPATIENT
Start: 2018-03-20 | End: 2018-05-03

## 2018-03-20 RX ORDER — AMLODIPINE BESYLATE 2.5 MG/1
2.5 TABLET ORAL DAILY
Qty: 30 TABLET | Refills: 5 | Status: SHIPPED | OUTPATIENT
Start: 2018-03-20 | End: 2018-09-27

## 2018-03-20 RX ORDER — CLOBETASOL PROPIONATE 0.5 MG/G
CREAM TOPICAL
COMMUNITY
Start: 2017-06-29 | End: 2018-07-20 | Stop reason: ALTCHOICE

## 2018-03-20 RX ORDER — VALACYCLOVIR HYDROCHLORIDE 1 G/1
TABLET, FILM COATED ORAL
Qty: 30 TABLET | Refills: 1 | Status: SHIPPED | OUTPATIENT
Start: 2018-03-20 | End: 2018-06-15

## 2018-03-20 RX ORDER — FLUTICASONE PROPIONATE 50 MCG
SPRAY, SUSPENSION (ML) NASAL
COMMUNITY
Start: 2018-02-16 | End: 2021-02-26

## 2018-03-21 NOTE — ASSESSMENT & PLAN NOTE
Strict diet control with portion size restriction, restriction of desserts, starches, fried foods and fatty foods discussed. Exercise for about 20 minutes daily. Walk or bike.

## 2018-03-21 NOTE — ASSESSMENT & PLAN NOTE
Normal exam.  Labs as ordered. Skin check–normal  No cervical, axillary, inguinal lymphadenopathy. Hernial orifices intact. Rectal exam normal,prostate normal to palpation. Small hemorrhoids present.  guaic negetive brown stools.   Patient is advised to

## 2018-03-21 NOTE — PATIENT INSTRUCTIONS
Problem List Items Addressed This Visit        High    Severe obesity (BMI 35.0-39. 9) with comorbidity (HCC) (Chronic)     Strict diet control with portion size restriction, restriction of desserts, starches, fried foods and fatty foods discussed.   Exercis levels and hence has been diagnosed with inflammatory polyarthritis in addition to psoriasis.   He is currently not on any medications–was on Rituxan in the past.  He does have significant skin lesions–may need local treatment and hence advised to contact h

## 2018-03-21 NOTE — ASSESSMENT & PLAN NOTE
Currently on treatment with the Chris hip for his CLL–tolerated well. Labs have been stable. He is managed per oncology at [facility].

## 2018-03-21 NOTE — ASSESSMENT & PLAN NOTE
Managed at rheumatology with [facility]. He does have elevated CCP levels and hence has been diagnosed with inflammatory polyarthritis in addition to psoriasis.   He is currently not on any medications–was on Rituxan in the past.  He does have significan

## 2018-03-21 NOTE — PROGRESS NOTES
HPI:    Patient ID: Miles Jones is a 63year old male. Physical    There is no immunization history on file for this patient.   Colonoscopy,10 Years due on 10/06/2007  Due for a colonoscopy at this time–old records not available and hence unable to a disease. It was then rituxan and revlimid - could not tolerate revlimid - sob-so d/cd in 10 days. Was started on ibrutinib on may 5 th 2016. Nothing aggravates the symptoms.  Treatments tried: He was treated with Rituxan every 6 months for this disorder a Packs/day: 1.00      Years: 33.00        Types: Cigarettes     Start date: 11/9/1970     Quit date: 6/1/2015  Smokeless tobacco: Never Used                      Comment: quit on and off for 10 yrs.  debbieoir to 2015, MG Oral Tab 1 tablet p.o. q. at bedtime (Patient taking differently: 200 mg. 1 tablet p.o. q. at bedtime ) Disp: 30 tablet Rfl: 2   tamsulosin HCl (FLOMAX) 0.4 MG Oral Cap 0.4 mg daily.    Disp:  Rfl:    finasteride (PROSCAR) 5 MG Oral Tab Take 5 mg by mout Skin: Skin is warm and dry. Nursing note and vitals reviewed. ASSESSMENT/PLAN:     Problem List Items Addressed This Visit        High    Severe obesity (BMI 35.0-39. 9) with comorbidity (HCC) (Chronic)     Strict diet control with portion s Psoriatic arthropathy ([facility] [address]     Managed at rheumatology with [facility]. He does have elevated CCP levels and hence has been diagnosed with inflammatory polyarthritis in addition to psoriasis.   He is currently not on any medications–was on Rituxan in th

## 2018-03-21 NOTE — ASSESSMENT & PLAN NOTE
Blood pressure 140/80, pulse 71, temperature 98.1 °F (36.7 °C), temperature source Oral, height 5' 8.5\" (1.74 m), weight 253 lb (114.8 kg). Persistent mild hypertension. Started on Norvasc 2.5 mg once daily.   Return for a follow-up evaluation in [facility]

## 2018-03-21 NOTE — ASSESSMENT & PLAN NOTE
History of recurrent cold sores–multiple herpetic oral lesions at his last visit was treated with Valtrex twice daily which has resulted in significant improvement. He does continue to have smaller lesions on the side of his mouth.   Advised to continue on

## 2018-03-21 NOTE — ASSESSMENT & PLAN NOTE
Significant improvement after treatment with Augmentin. He however continues to have nasal congestion, sinus pressure. Advised to continue on Bactrim DS after completion of his augmented–1 tablet once daily for about 4 weeks.

## 2018-03-21 NOTE — ASSESSMENT & PLAN NOTE
Managed per [place]rology [place] [facility]. He is currently on tamsulosin and finasteride. PSA completed in July 2017 was stable.

## 2018-05-05 RX ORDER — SULFAMETHOXAZOLE AND TRIMETHOPRIM 800; 160 MG/1; MG/1
TABLET ORAL
Qty: 30 TABLET | Refills: 5 | Status: SHIPPED | OUTPATIENT
Start: 2018-05-05 | End: 2018-09-18

## 2018-05-05 RX ORDER — CLOTRIMAZOLE AND BETAMETHASONE DIPROPIONATE 10; .64 MG/G; MG/G
CREAM TOPICAL
Qty: 60 G | Refills: 3 | Status: SHIPPED | OUTPATIENT
Start: 2018-05-05 | End: 2018-09-18

## 2018-06-08 NOTE — TELEPHONE ENCOUNTER
Requesting Montelukast refill    Refill Protocol Appointment Criteria  · Appointment scheduled in the past 6 months or in the next 3 months  Recent Outpatient Visits            2 months ago Oral herpes simplex infection    [address] [place], [address], [facility]

## 2018-06-11 RX ORDER — MONTELUKAST SODIUM 10 MG/1
TABLET ORAL
Qty: 30 TABLET | Refills: 5 | Status: SHIPPED | OUTPATIENT
Start: 2018-06-11 | End: 2018-11-25

## 2018-06-15 RX ORDER — VALACYCLOVIR HYDROCHLORIDE 1 G/1
TABLET, FILM COATED ORAL
Qty: 30 TABLET | Refills: 5 | Status: SHIPPED | OUTPATIENT
Start: 2018-06-15 | End: 2018-09-18

## 2018-07-20 ENCOUNTER — LAB ENCOUNTER (OUTPATIENT)
Dept: LAB | Facility: HOSPITAL | Age: 63
End: 2018-07-20
Attending: INTERNAL MEDICINE
Payer: MEDICARE

## 2018-07-20 ENCOUNTER — TELEPHONE (OUTPATIENT)
Dept: OTHER | Age: 63
End: 2018-07-20

## 2018-07-20 ENCOUNTER — OFFICE VISIT (OUTPATIENT)
Dept: INTERNAL MEDICINE CLINIC | Facility: CLINIC | Age: 63
End: 2018-07-20
Payer: MEDICARE

## 2018-07-20 VITALS
HEIGHT: 68.5 IN | HEART RATE: 71 BPM | BODY MASS INDEX: 37.6 KG/M2 | WEIGHT: 251 LBS | DIASTOLIC BLOOD PRESSURE: 81 MMHG | SYSTOLIC BLOOD PRESSURE: 133 MMHG

## 2018-07-20 DIAGNOSIS — Z00.00 ROUTINE PHYSICAL EXAMINATION: ICD-10-CM

## 2018-07-20 DIAGNOSIS — D72.9 ABNORMAL NEUTROPHIL COUNT: Primary | ICD-10-CM

## 2018-07-20 DIAGNOSIS — J02.9 SORE THROAT: Primary | ICD-10-CM

## 2018-07-20 LAB
ALBUMIN SERPL BCP-MCNC: 4.3 G/DL (ref 3.5–4.8)
ALBUMIN/GLOB SERPL: 1.8 {RATIO} (ref 1–2)
ALP SERPL-CCNC: 69 U/L (ref 32–100)
ALT SERPL-CCNC: 27 U/L (ref 17–63)
ANION GAP SERPL CALC-SCNC: 7 MMOL/L (ref 0–18)
AST SERPL-CCNC: 23 U/L (ref 15–41)
BASOPHILS # BLD: 0 K/UL (ref 0–0.2)
BASOPHILS NFR BLD: 1 %
BILIRUB SERPL-MCNC: 0.9 MG/DL (ref 0.3–1.2)
BILIRUB UR QL: NEGATIVE
BUN SERPL-MCNC: 9 MG/DL (ref 8–20)
BUN/CREAT SERPL: 12.7 (ref 10–20)
CALCIUM SERPL-MCNC: 9.2 MG/DL (ref 8.5–10.5)
CHLORIDE SERPL-SCNC: 105 MMOL/L (ref 95–110)
CHOLEST SERPL-MCNC: 217 MG/DL (ref 110–200)
CLARITY UR: CLEAR
CO2 SERPL-SCNC: 27 MMOL/L (ref 22–32)
COLOR UR: YELLOW
CREAT SERPL-MCNC: 0.71 MG/DL (ref 0.5–1.5)
EOSINOPHIL # BLD: 0 K/UL (ref 0–0.7)
EOSINOPHIL NFR BLD: 1 %
ERYTHROCYTE [DISTWIDTH] IN BLOOD BY AUTOMATED COUNT: 14.5 % (ref 11–15)
GLOBULIN PLAS-MCNC: 2.4 G/DL (ref 2.5–3.7)
GLUCOSE SERPL-MCNC: 114 MG/DL (ref 70–99)
GLUCOSE UR-MCNC: NEGATIVE MG/DL
HCT VFR BLD AUTO: 44.6 % (ref 41–52)
HDLC SERPL-MCNC: 39 MG/DL
HGB BLD-MCNC: 15 G/DL (ref 13.5–17.5)
HGB UR QL STRIP.AUTO: NEGATIVE
KETONES UR-MCNC: NEGATIVE MG/DL
LDLC SERPL CALC-MCNC: 141 MG/DL (ref 0–99)
LEUKOCYTE ESTERASE UR QL STRIP.AUTO: NEGATIVE
LYMPHOCYTES # BLD: 1.9 K/UL (ref 1–4)
LYMPHOCYTES NFR BLD: 75 %
MCH RBC QN AUTO: 31.4 PG (ref 27–32)
MCHC RBC AUTO-ENTMCNC: 33.7 G/DL (ref 32–37)
MCV RBC AUTO: 93.3 FL (ref 80–100)
MONOCYTES # BLD: 0.4 K/UL (ref 0–1)
MONOCYTES NFR BLD: 16 %
NEUTROPHILS # BLD AUTO: 0.2 K/UL (ref 1.8–7.7)
NEUTROPHILS NFR BLD: 7 %
NITRITE UR QL STRIP.AUTO: NEGATIVE
NONHDLC SERPL-MCNC: 178 MG/DL
OSMOLALITY UR CALC.SUM OF ELEC: 288 MOSM/KG (ref 275–295)
PATIENT FASTING: YES
PH UR: 7 [PH] (ref 5–8)
PLATELET # BLD AUTO: 123 K/UL (ref 140–400)
PMV BLD AUTO: 6.9 FL (ref 7.4–10.3)
POTASSIUM SERPL-SCNC: 4.2 MMOL/L (ref 3.3–5.1)
PROT SERPL-MCNC: 6.7 G/DL (ref 5.9–8.4)
PROT UR-MCNC: NEGATIVE MG/DL
RBC # BLD AUTO: 4.78 M/UL (ref 4.5–5.9)
SODIUM SERPL-SCNC: 139 MMOL/L (ref 136–144)
SP GR UR STRIP: 1.01 (ref 1–1.03)
TRIGL SERPL-MCNC: 184 MG/DL (ref 1–149)
UROBILINOGEN UR STRIP-ACNC: <2
VIT C UR-MCNC: NEGATIVE MG/DL
WBC # BLD AUTO: 2.5 K/UL (ref 4–11)

## 2018-07-20 PROCEDURE — 36415 COLL VENOUS BLD VENIPUNCTURE: CPT

## 2018-07-20 PROCEDURE — 85025 COMPLETE CBC W/AUTO DIFF WBC: CPT

## 2018-07-20 PROCEDURE — 80053 COMPREHEN METABOLIC PANEL: CPT

## 2018-07-20 PROCEDURE — 80061 LIPID PANEL: CPT

## 2018-07-20 PROCEDURE — 81003 URINALYSIS AUTO W/O SCOPE: CPT

## 2018-07-20 PROCEDURE — 99213 OFFICE O/P EST LOW 20 MIN: CPT | Performed by: PHYSICIAN ASSISTANT

## 2018-07-20 RX ORDER — CETIRIZINE HYDROCHLORIDE 10 MG/1
10 TABLET ORAL DAILY
Qty: 30 TABLET | Refills: 3 | Status: SHIPPED | OUTPATIENT
Start: 2018-07-20

## 2018-07-20 NOTE — PROGRESS NOTES
HPI:    Patient ID: Laney Morales is a 63year old male. HPI   Patient with history of sleep apnea, bipolar disorder, BPH, CLL on improving cough, and chronic sinusitis on Bactrim prophylaxis presents today with complaints of sore throat.   Over the la External Cream APPLY TO AFFECTED AREA TWICE DAILY Disp: 60 g Rfl: 3   Fluticasone Propionate 50 MCG/ACT Nasal Suspension SPRAY TWICE IN EACH NOSTRIL EVERY DAY Disp:  Rfl:    AmLODIPine Besylate (NORVASC) 2.5 MG Oral Tab Take 1 tablet (2.5 mg total) by mout Chloraseptic spray, and saltwater gargle may help with throat pain. Advised to contact the office if symptoms worsen or do not improve in the coming days. No orders of the defined types were placed in this encounter.       Meds This Visit:  Signed Presc

## 2018-07-20 NOTE — TELEPHONE ENCOUNTER
Pt has seen Kris Bowden today with a sorethroat,spoke with rosa elena chang to call pt with the low neutrophil counts-very low-adv to contact oncology for management.

## 2018-07-20 NOTE — TELEPHONE ENCOUNTER
Patient advised of Dr. Bouchra Cortez notes. He was already contacted today earlier as well. Patient has his own oncologist at [facility] and has followed up with him already. [address] [facility] Referral generated, not needed at this time.

## 2018-07-20 NOTE — TELEPHONE ENCOUNTER
Dr. Merly Olivera: please review CBC. Lab called with critical value, Absolute neutrophil 0.2    Patient was seen today.

## 2018-09-18 ENCOUNTER — OFFICE VISIT (OUTPATIENT)
Dept: INTERNAL MEDICINE CLINIC | Facility: CLINIC | Age: 63
End: 2018-09-18
Payer: MEDICARE

## 2018-09-18 VITALS
HEIGHT: 68.5 IN | DIASTOLIC BLOOD PRESSURE: 79 MMHG | HEART RATE: 61 BPM | WEIGHT: 254 LBS | BODY MASS INDEX: 38.05 KG/M2 | RESPIRATION RATE: 18 BRPM | SYSTOLIC BLOOD PRESSURE: 127 MMHG

## 2018-09-18 DIAGNOSIS — B00.2 ORAL HERPES SIMPLEX INFECTION: ICD-10-CM

## 2018-09-18 DIAGNOSIS — J32.0 CHRONIC MAXILLARY SINUSITIS: ICD-10-CM

## 2018-09-18 DIAGNOSIS — C91.10 CLL (CHRONIC LYMPHOCYTIC LEUKEMIA) (HCC): Primary | ICD-10-CM

## 2018-09-18 DIAGNOSIS — L40.50 PSORIATIC ARTHROPATHY (HCC): ICD-10-CM

## 2018-09-18 DIAGNOSIS — I10 ESSENTIAL HYPERTENSION: ICD-10-CM

## 2018-09-18 DIAGNOSIS — N40.0 BENIGN PROSTATIC HYPERPLASIA, UNSPECIFIED WHETHER LOWER URINARY TRACT SYMPTOMS PRESENT: ICD-10-CM

## 2018-09-18 PROBLEM — B00.1 RECURRENT COLD SORES: Status: RESOLVED | Noted: 2017-12-28 | Resolved: 2018-09-18

## 2018-09-18 PROCEDURE — G0463 HOSPITAL OUTPT CLINIC VISIT: HCPCS | Performed by: INTERNAL MEDICINE

## 2018-09-18 PROCEDURE — 99214 OFFICE O/P EST MOD 30 MIN: CPT | Performed by: INTERNAL MEDICINE

## 2018-09-18 RX ORDER — ACYCLOVIR 400 MG/1
400 TABLET ORAL DAILY
COMMUNITY
End: 2019-08-29

## 2018-09-18 RX ORDER — CLOTRIMAZOLE AND BETAMETHASONE DIPROPIONATE 10; .64 MG/G; MG/G
CREAM TOPICAL
Qty: 45 G | Refills: 3 | Status: SHIPPED | OUTPATIENT
Start: 2018-09-18

## 2018-09-19 NOTE — ASSESSMENT & PLAN NOTE
Blood pressure 127/79, pulse 61, resp. rate 18, height 5' 8.5\" (1.74 m), weight 254 lb (115.2 kg). Stable blood pressure, very well controlled at this time on amlodipine 2.5 mg daily.   Recent labs–renal functions as well as lipid panel discussed–LDL chol

## 2018-09-19 NOTE — ASSESSMENT & PLAN NOTE
He is usually seen by urology at [facility]. He is currently on tamsulosin and finasteride.   PSA completed in August 2018 was normal.

## 2018-09-19 NOTE — ASSESSMENT & PLAN NOTE
He is on treatment with imbrutinib has tolerated indications well. He did have a drop in his neutrophil count recently most likely due to Bactrim DS suppressive treatment of maxillary sinusitis. Since being off the Bactrim DS his numbers have improved.

## 2018-09-19 NOTE — ASSESSMENT & PLAN NOTE
History of recurrent herpetic oral lesions. This did respond with Valtrex but he is currently on acyclovir for suppressive treatment through [facility]. He seems to have responded well and hence we will continue to monitor .

## 2018-09-19 NOTE — ASSESSMENT & PLAN NOTE
Patient has [place] [facility] [place]atic arthropathy. His inflammatory polyarthritis seems stable at this time. He does have significant skin lesions–advised to follow-up with Dr. Sienna Guzman, [phone] for an evaluation.

## 2018-09-19 NOTE — PATIENT INSTRUCTIONS
Problem List Items Addressed This Visit        Unprioritized    BPH (benign prostatic hyperplasia)     He is usually seen by urology at [facility]. He is currently on tamsulosin and finasteride.   PSA completed in August 2018 was normal.         Relevant Patient has been managed at [facility] for the psoriatic arthropathy. His inflammatory polyarthritis seems stable at this time. He does have significant skin lesions–advised to follow-up with Dr. Zenia Forrest, [phone] for an evaluation.

## 2018-09-19 NOTE — PROGRESS NOTES
HPI:    Patient ID: Callie Wall is a 63year old male.     Treatment Diagnosis:   CLL (chronic lymphocytic leukemia) Trisomy 12 (primary encounter diagnosis)    Interval History:  Callie Wall is a 63 y.o. male    Oncology History:    Earl Chiu i bactrim DS QD for >1 month for recurrent sore throats    Medications    ASSESSMENT:    1) CLL  2) new neutropenia which can be traced to starting bactrim DS QD    PLAN:  1) DC Bactrim  2) repeat CBC in 3 weeks    MD Pam Baker and Tee Brooks Prof d/cd in 10 days. Was started on ibrutinib on may 5 th 2016. Nothing aggravates the symptoms.  Treatments tried: He was treated with Rituxan every 6 months for this disorder and because he was thought to have psoriatic arthritis or rheumatoid arthritis, and Take 1 tablet (2.5 mg total) by mouth daily. Disp: 30 tablet Rfl: 5   ibrutinib (IMBRUVICA) 140 MG Oral Cap Take 420 mg by mouth daily.  Disp:  Rfl:    lamoTRIgine 150 MG Oral Tab 1 tablet p.o. q. at bedtime (Patient taking differently: 200 mg. 1 tablet p.o with ENT if any recurrent symptoms. CLL (chronic lymphocytic leukemia) ([facility] [address] - Primary     He is on treatment with imbrutinib has tolerated indications well.   He did have a drop in his neutrophil count recently most likely due to Bactrim DS suppres PANEL          Return in about 6 months (around 3/18/2019).     PT UNDERSTANDS AND AGREES TO FOLLOW DIRECTIONS AND ADVICE    Orders Placed This Encounter      Comp Metabolic Panel (14) [E]      Lipid Panel [E]      Meds This Visit:  Requested Prescriptions

## 2018-09-19 NOTE — ASSESSMENT & PLAN NOTE
Patient is currently not on any medications. He is advised to take cetirizine 10 mg daily Flonase 1 puff each nostril 1-2 times daily. Consider follow-up with ENT if any recurrent symptoms.

## 2018-09-20 ENCOUNTER — TELEPHONE (OUTPATIENT)
Dept: OTHER | Age: 63
End: 2018-09-20

## 2018-09-20 NOTE — TELEPHONE ENCOUNTER
Pt states he was advised to contact doctor with name of cream he had been using for psoriasis and he did not have name of med at 9/18/18 OV. Pt states name of med is Clobetasol Propionate 0.05%.

## 2018-09-27 RX ORDER — AMLODIPINE BESYLATE 2.5 MG/1
TABLET ORAL
Qty: 30 TABLET | Refills: 5 | Status: SHIPPED | OUTPATIENT
Start: 2018-09-27 | End: 2019-03-24

## 2018-09-28 NOTE — TELEPHONE ENCOUNTER
Refilled per protocol      Hypertensive Medications  Protocol Criteria:  · Appointment scheduled in the past 6 months or in the next 3 months  · BMP or CMP in the past 12 months  · Creatinine result < 2  Recent Outpatient Visits            1 week ago CLL (

## 2018-11-08 ENCOUNTER — HOSPITAL ENCOUNTER (EMERGENCY)
Facility: HOSPITAL | Age: 63
Discharge: HOME | End: 2018-11-08
Attending: EMERGENCY MEDICINE
Payer: MEDICARE

## 2018-11-08 ENCOUNTER — APPOINTMENT (OUTPATIENT)
Dept: ULTRASOUND IMAGING | Facility: HOSPITAL | Age: 63
End: 2018-11-08
Attending: EMERGENCY MEDICINE
Payer: MEDICARE

## 2018-11-08 ENCOUNTER — NURSE TRIAGE (OUTPATIENT)
Dept: INTERNAL MEDICINE CLINIC | Facility: CLINIC | Age: 63
End: 2018-11-08

## 2018-11-08 VITALS
OXYGEN SATURATION: 97 % | TEMPERATURE: 99 F | RESPIRATION RATE: 18 BRPM | SYSTOLIC BLOOD PRESSURE: 139 MMHG | HEIGHT: 69 IN | HEART RATE: 87 BPM | WEIGHT: 250 LBS | BODY MASS INDEX: 37.03 KG/M2 | DIASTOLIC BLOOD PRESSURE: 78 MMHG

## 2018-11-08 DIAGNOSIS — M79.89 RIGHT LEG SWELLING: Primary | ICD-10-CM

## 2018-11-08 PROCEDURE — 93971 EXTREMITY STUDY: CPT | Performed by: EMERGENCY MEDICINE

## 2018-11-08 PROCEDURE — 99284 EMERGENCY DEPT VISIT MOD MDM: CPT

## 2018-11-08 NOTE — TELEPHONE ENCOUNTER
Action Requested: Summary for Provider     []  Critical Lab, Recommendations Needed  [] Need Additional Advice  [x]   FYI    []   Need Orders  [] Need Medications Sent to Pharmacy  []  Other     SUMMARY: Pt called states that he \"overdid it\" this past

## 2018-11-09 NOTE — ED PROVIDER NOTES
Patient Seen in: [facility] Emergency Department    History   Patient presents with:  Swelling Edema (cardiovascular, metabolic)    Stated Complaint:  SWOLLEN R LEG    HPI    Patient is a 24-year-old male currently on chemotherapy who presents wit Oral   SpO2 97 %   O2 Device        Current:/78   Pulse 87   Temp 99.2 °F (37.3 °C) (Oral)   Resp 18   Ht 175.3 cm (5' 9\")   Wt 113.4 kg   SpO2 97%   BMI 36.92 kg/m²         Physical Exam    GENERAL: No acute distress, awake and alert  HEENT: MMM, E

## 2018-11-12 ENCOUNTER — TELEPHONE (OUTPATIENT)
Dept: OTHER | Age: 63
End: 2018-11-12

## 2018-11-12 NOTE — TELEPHONE ENCOUNTER
Patient calling and states that he went to ER last 11/8 due to R leg swelling, better now, using compression stockings and whirlpool helps with the swelling, no sob, urinating good, requesting if Dr Qian Wakefield can just call him to discuss his issue or will nee

## 2018-11-13 ENCOUNTER — OFFICE VISIT (OUTPATIENT)
Dept: INTERNAL MEDICINE CLINIC | Facility: CLINIC | Age: 63
End: 2018-11-13
Payer: MEDICARE

## 2018-11-13 VITALS
BODY MASS INDEX: 38.95 KG/M2 | HEART RATE: 76 BPM | SYSTOLIC BLOOD PRESSURE: 145 MMHG | HEIGHT: 68.5 IN | DIASTOLIC BLOOD PRESSURE: 83 MMHG | RESPIRATION RATE: 16 BRPM | WEIGHT: 260 LBS

## 2018-11-13 DIAGNOSIS — H69.81 DYSFUNCTION OF RIGHT EUSTACHIAN TUBE: ICD-10-CM

## 2018-11-13 DIAGNOSIS — R60.0 LEG EDEMA, RIGHT: Primary | ICD-10-CM

## 2018-11-13 PROBLEM — H69.91 DYSFUNCTION OF RIGHT EUSTACHIAN TUBE: Status: ACTIVE | Noted: 2018-11-13

## 2018-11-13 PROCEDURE — 99214 OFFICE O/P EST MOD 30 MIN: CPT | Performed by: INTERNAL MEDICINE

## 2018-11-13 PROCEDURE — G0463 HOSPITAL OUTPT CLINIC VISIT: HCPCS | Performed by: INTERNAL MEDICINE

## 2018-11-13 RX ORDER — CHLORTHALIDONE 25 MG/1
25 TABLET ORAL DAILY
Qty: 5 TABLET | Refills: 0 | Status: SHIPPED | OUTPATIENT
Start: 2018-11-13 | End: 2018-11-21

## 2018-11-13 RX ORDER — POTASSIUM CHLORIDE 750 MG/1
10 TABLET, FILM COATED, EXTENDED RELEASE ORAL DAILY
Qty: 5 TABLET | Refills: 0 | Status: SHIPPED | OUTPATIENT
Start: 2018-11-13 | End: 2018-11-21

## 2018-11-13 NOTE — TELEPHONE ENCOUNTER
Patient will need to be seen. Please schedule an appointment with [facility] for an assessment.

## 2018-11-14 NOTE — ASSESSMENT & PLAN NOTE
Patient does have a history of chronic sinus infections. Has developed some difficulty hearing out of the right ear and fullness. On examination he does have some bulging in the right tympanic membrane. No signs of an infection at this time.   He is advi

## 2018-11-14 NOTE — PATIENT INSTRUCTIONS
Problem List Items Addressed This Visit        Unprioritized    Dysfunction of right eustachian tube     Patient does have a history of chronic sinus infections. Has developed some difficulty hearing out of the right ear and fullness.   On examination he d

## 2018-11-14 NOTE — ASSESSMENT & PLAN NOTE
Right lower extremity swelling noticed after few days of heavy work around the home. Blood pressure 145/83, pulse 76, resp. rate 16, height 5' 8.5\" (1.74 m), weight 260 lb (117.9 kg). Started on chlorthalidone 1 tablet once daily for 5 days.   To take po

## 2018-11-19 ENCOUNTER — OFFICE VISIT (OUTPATIENT)
Dept: OTOLARYNGOLOGY | Facility: CLINIC | Age: 63
End: 2018-11-19
Payer: MEDICARE

## 2018-11-19 VITALS
WEIGHT: 260 LBS | DIASTOLIC BLOOD PRESSURE: 80 MMHG | SYSTOLIC BLOOD PRESSURE: 129 MMHG | TEMPERATURE: 98 F | HEIGHT: 68.5 IN | BODY MASS INDEX: 38.95 KG/M2

## 2018-11-19 DIAGNOSIS — H66.004 RECURRENT ACUTE SUPPURATIVE OTITIS MEDIA OF RIGHT EAR WITHOUT SPONTANEOUS RUPTURE OF TYMPANIC MEMBRANE: ICD-10-CM

## 2018-11-19 DIAGNOSIS — J32.9 CHRONIC SINUSITIS, UNSPECIFIED LOCATION: Primary | ICD-10-CM

## 2018-11-19 PROCEDURE — 99213 OFFICE O/P EST LOW 20 MIN: CPT | Performed by: OTOLARYNGOLOGY

## 2018-11-19 PROCEDURE — 69210 REMOVE IMPACTED EAR WAX UNI: CPT | Performed by: OTOLARYNGOLOGY

## 2018-11-19 RX ORDER — AMOXICILLIN AND CLAVULANATE POTASSIUM 875; 125 MG/1; MG/1
1 TABLET, FILM COATED ORAL 2 TIMES DAILY
Qty: 28 TABLET | Refills: 1 | Status: SHIPPED | OUTPATIENT
Start: 2018-11-19 | End: 2018-12-03

## 2018-11-19 RX ORDER — NEOMYCIN SULFATE, POLYMYXIN B SULFATE AND HYDROCORTISONE 10; 3.5; 1 MG/ML; MG/ML; [USP'U]/ML
3 SUSPENSION/ DROPS AURICULAR (OTIC) 3 TIMES DAILY
Qty: 1 BOTTLE | Refills: 1 | Status: SHIPPED | OUTPATIENT
Start: 2018-11-19 | End: 2018-11-29

## 2018-11-19 NOTE — PROGRESS NOTES
HPI:    Patient ID: Miles Jones is a 66year old male. Er geeta discussed      Ear Problem    There is pain in the right ear. This is a recurrent problem. The current episode started in the past 7 days. The problem occurs constantly.  The problem has clotrimazole-betamethasone 1-0.05 % External Cream APPLY TO AFFECTED AREA TWICE DAILY Disp: 45 g Rfl: 3   cetirizine 10 MG Oral Tab Take 1 tablet (10 mg total) by mouth daily.  Disp: 30 tablet Rfl: 3   MONTELUKAST SODIUM 10 MG Oral Tab TAKE 1 TABLET(10 MG Abdominal: Soft. Bowel sounds are normal. He exhibits no distension. There is no tenderness. Musculoskeletal: Normal range of motion. He exhibits edema (rle worse than left). Lymphadenopathy:     He has no cervical adenopathy.    Neurological: He is a 1 tablet (10 mEq total) by mouth daily.        Imaging & Referrals:  None       EX#[id number]

## 2018-11-20 ENCOUNTER — LAB ENCOUNTER (OUTPATIENT)
Dept: LAB | Facility: HOSPITAL | Age: 63
End: 2018-11-20
Attending: INTERNAL MEDICINE
Payer: MEDICARE

## 2018-11-20 DIAGNOSIS — C91.90: Primary | ICD-10-CM

## 2018-11-20 PROCEDURE — 85025 COMPLETE CBC W/AUTO DIFF WBC: CPT

## 2018-11-20 PROCEDURE — 36415 COLL VENOUS BLD VENIPUNCTURE: CPT

## 2018-11-20 NOTE — PROGRESS NOTES
Kira Car is a 66year old male. Patient presents with:  Ear Problem: bilateral ears feel full  Sinus Problem: mucus is thick green, yellow/brown, headache      HISTORY OF PRESENT ILLNESS  10/28/2017  Patient prevents for evaluation of sinusitis.   H Smoker        Packs/day: 1.00        Years: 33.00        Pack years: 35        Types: Cigarettes        Start date: 11/9/1970        Quit date: 6/1/2015        Years since quitting: 3.4      Smokeless tobacco: Never Used      Tobacco comment: quit on and o Respiratory Negative Dyspnea and wheezing. Cardio Negative Chest pain, irregular heartbeat/palpitations and syncope. GI Negative Abdominal pain and diarrhea. Endocrine Negative Cold intolerance and heat intolerance. Neuro Negative Tremors.    Psyc Neomycin-Polymyxin-HC 3.5-72716-0 Otic Suspension, Place 3 drops into the right ear 3 (three) times daily for 10 days. , Disp: 1 Bottle, Rfl: 1  •  Amoxicillin-Pot Clavulanate (AUGMENTIN) 875-125 MG Oral Tab, Take 1 tablet by mouth 2 (two) times daily for 1 instructed to call if symptoms do not resolve. Commended 2 weeks of Augmentin consider refilling it he is not completely better. Consider myringotomy for manju if this does not resolve  2.  Chronic mastoiditis with otorrhea right-sided  Does not want to pro

## 2018-11-21 ENCOUNTER — TELEPHONE (OUTPATIENT)
Dept: OTHER | Age: 63
End: 2018-11-21

## 2018-11-21 RX ORDER — CHLORTHALIDONE 25 MG/1
25 TABLET ORAL DAILY
Qty: 10 TABLET | Refills: 0 | Status: SHIPPED | OUTPATIENT
Start: 2018-11-21 | End: 2018-12-05

## 2018-11-21 RX ORDER — POTASSIUM CHLORIDE 750 MG/1
10 TABLET, FILM COATED, EXTENDED RELEASE ORAL DAILY
Qty: 10 TABLET | Refills: 0 | Status: SHIPPED | OUTPATIENT
Start: 2018-11-21 | End: 2018-12-06

## 2018-11-21 NOTE — TELEPHONE ENCOUNTER
LOV 11/13/18 with Dr Kishore Smith and was prescribed with Chlorthalidone and potassium supplements both for 5 days and completed it 3 days ago, states that R LE is better but still R calf and top of the foot/toes  are still swollen , states that he is urinating

## 2018-11-21 NOTE — TELEPHONE ENCOUNTER
Spoke with patient, advised Dr Rian Hunter note and verbalized understanding. Pharmacy verified, medications generated.       Note      Continue the chlorthalidone and potassium supplements x 10 days

## 2018-11-26 RX ORDER — MONTELUKAST SODIUM 10 MG/1
TABLET ORAL
Qty: 30 TABLET | Refills: 5 | Status: SHIPPED | OUTPATIENT
Start: 2018-11-26 | End: 2019-05-15

## 2018-11-26 NOTE — TELEPHONE ENCOUNTER
Refill Protocol Appointment Criteria  · Appointment scheduled in the past 6 months or in the next 3 months  Recent Outpatient Visits            6 days ago Chronic sinusitis, unspecified location    [facility], [address], [place]

## 2018-11-27 ENCOUNTER — TELEPHONE (OUTPATIENT)
Dept: OTHER | Age: 63
End: 2018-11-27

## 2018-11-27 NOTE — TELEPHONE ENCOUNTER
Nanda Tucker pt stated that he will like to know his test results from 11/20 he was worried about the WBC. Pt was inform it was normal.   Nanda Tucker I know it was not order by you but he send the test results to you also.  He will call [facility] [address] office

## 2018-12-02 RX ORDER — POTASSIUM CHLORIDE 750 MG/1
TABLET, FILM COATED, EXTENDED RELEASE ORAL
Qty: 10 TABLET | Refills: 0 | Status: CANCELLED | OUTPATIENT
Start: 2018-12-02

## 2018-12-03 ENCOUNTER — TELEPHONE (OUTPATIENT)
Dept: OTHER | Age: 63
End: 2018-12-03

## 2018-12-03 NOTE — TELEPHONE ENCOUNTER
Received a call from the patient who reports he still has swelling in the right leg. Patient reports he has taken 2 courses of the chlorthalidone and potassium. Reports the swelling has improved, but it has not resolved.  Patient is asking advice from Dr. Anahi Adams

## 2018-12-04 NOTE — TELEPHONE ENCOUNTER
Patient calling to follow-up. Still having swelling but a lot better than it was. Patient is done with the potassium and chlorthalidone. Wanted to get doctor's advice. Please advise.

## 2018-12-05 ENCOUNTER — OFFICE VISIT (OUTPATIENT)
Dept: INTERNAL MEDICINE CLINIC | Facility: CLINIC | Age: 63
End: 2018-12-05
Payer: MEDICARE

## 2018-12-05 VITALS
HEART RATE: 74 BPM | BODY MASS INDEX: 40 KG/M2 | DIASTOLIC BLOOD PRESSURE: 80 MMHG | HEIGHT: 68.5 IN | WEIGHT: 267 LBS | SYSTOLIC BLOOD PRESSURE: 133 MMHG

## 2018-12-05 DIAGNOSIS — R60.0 LEG EDEMA, RIGHT: Primary | ICD-10-CM

## 2018-12-05 PROCEDURE — 99213 OFFICE O/P EST LOW 20 MIN: CPT | Performed by: PHYSICIAN ASSISTANT

## 2018-12-05 PROCEDURE — G0463 HOSPITAL OUTPT CLINIC VISIT: HCPCS | Performed by: PHYSICIAN ASSISTANT

## 2018-12-05 RX ORDER — SULFAMETHOXAZOLE AND TRIMETHOPRIM 800; 160 MG/1; MG/1
1 TABLET ORAL 2 TIMES DAILY
Qty: 20 TABLET | Refills: 0 | Status: SHIPPED | OUTPATIENT
Start: 2018-12-05 | End: 2018-12-05 | Stop reason: CLARIF

## 2018-12-05 NOTE — PROGRESS NOTES
HPI:    Patient ID: Ari Ramirez is a 66year old male. HPI   Patient with history of sleep apnea, bipolar disorder, BPH, CLL presents for follow-up of right lower extremity edema. He initially presented to ED on 11/8/18.   Venous Doppler at that alberto AMLODIPINE BESYLATE 2.5 MG Oral Tab TAKE 1 TABLET BY MOUTH EVERY DAY Disp: 30 tablet Rfl: 5   acyclovir 400 MG Oral Tab Take 400 mg by mouth daily. Disp:  Rfl:    TADALAFIL OR Take 4.5 mg by mouth daily.  Disp:  Rfl:    clotrimazole-betamethasone 1-0.05 % adenopathy. Neurological: He is alert and oriented to person, place, and time. Skin: Skin is warm and dry. Dry flaky skin bilateral lower legs   Psychiatric: He has a normal mood and affect. His behavior is normal.              ASSESSMENT/PLAN:   1.

## 2018-12-05 NOTE — TELEPHONE ENCOUNTER
Patient calling back again for ELMIRA response from initial call 12/3/18--\"I will keep calling every two hours until she answers. \"

## 2018-12-05 NOTE — TELEPHONE ENCOUNTER
Call was transferred to me and pt stated that he wants to get in today if this is the only way he can get help.   He will see Rubia Carter at 2:30 pm.

## 2018-12-06 ENCOUNTER — APPOINTMENT (OUTPATIENT)
Dept: LAB | Facility: HOSPITAL | Age: 63
End: 2018-12-06
Attending: INTERNAL MEDICINE
Payer: MEDICARE

## 2018-12-06 ENCOUNTER — TELEPHONE (OUTPATIENT)
Dept: INTERNAL MEDICINE CLINIC | Facility: CLINIC | Age: 63
End: 2018-12-06

## 2018-12-06 DIAGNOSIS — R06.00 DYSPNEA ON EXERTION: ICD-10-CM

## 2018-12-06 DIAGNOSIS — R60.0 LEG EDEMA, RIGHT: ICD-10-CM

## 2018-12-06 DIAGNOSIS — R06.00 DYSPNEA ON EXERTION: Primary | ICD-10-CM

## 2018-12-06 DIAGNOSIS — I10 ESSENTIAL HYPERTENSION: ICD-10-CM

## 2018-12-06 PROCEDURE — 36415 COLL VENOUS BLD VENIPUNCTURE: CPT

## 2018-12-06 PROCEDURE — 80061 LIPID PANEL: CPT

## 2018-12-06 PROCEDURE — 80053 COMPREHEN METABOLIC PANEL: CPT

## 2018-12-06 PROCEDURE — 83880 ASSAY OF NATRIURETIC PEPTIDE: CPT

## 2018-12-06 RX ORDER — CHLORTHALIDONE 25 MG/1
25 TABLET ORAL DAILY
Qty: 30 TABLET | Refills: 2 | Status: SHIPPED | OUTPATIENT
Start: 2018-12-06 | End: 2018-12-10

## 2018-12-06 RX ORDER — POTASSIUM CHLORIDE 750 MG/1
10 TABLET, FILM COATED, EXTENDED RELEASE ORAL DAILY
Qty: 30 TABLET | Refills: 2 | Status: SHIPPED | OUTPATIENT
Start: 2018-12-06 | End: 2019-02-23

## 2018-12-06 NOTE — TELEPHONE ENCOUNTER
Per Rashida pt is there at the lab and the he would like the diagnosis changed to something that can be covered thru medicare or his insurance.  Transferred call to the [facility]

## 2018-12-10 RX ORDER — CHLORTHALIDONE 25 MG/1
TABLET ORAL
Qty: 30 TABLET | Refills: 3 | Status: SHIPPED | OUTPATIENT
Start: 2018-12-10 | End: 2019-07-04

## 2018-12-20 ENCOUNTER — TELEPHONE (OUTPATIENT)
Dept: INTERNAL MEDICINE CLINIC | Facility: CLINIC | Age: 63
End: 2018-12-20

## 2018-12-20 NOTE — TELEPHONE ENCOUNTER
Pt was last seen by LR on 12/5/18 for right leg swelling and states LR advised he just continue taking the Augmentin that ENT prescribed for sinus infection issue to see if would help leg swelling. Denies redness, red streaking, fever.  States swelling has

## 2018-12-20 NOTE — TELEPHONE ENCOUNTER
The course of Augmentin is adequate to treat any infection. The swelling may take longer to clear however.  We do not need additional antibiotics at this time but would recommend elevation of the leg above the level of the heart whenever possible and use of

## 2019-01-08 ENCOUNTER — TELEPHONE (OUTPATIENT)
Dept: INTERNAL MEDICINE CLINIC | Facility: CLINIC | Age: 64
End: 2019-01-08

## 2019-01-08 NOTE — TELEPHONE ENCOUNTER
Patient calling in to discuss stopping chlorthalidone. He has been on medication for 1 month for lower extremity swelling which has completely resolved. He is having significant increased urinary frequency and getting up over 10 times per night to urinate. Can do a trial off medication. Advised to monitor blood pressure with goal less than 130/80 and contact the office if swelling recurs.

## 2019-02-24 NOTE — TELEPHONE ENCOUNTER
Review pended refill request as it does not fall under a protocol.     Last Rx: 12-6-18  LOV: 12-5-18

## 2019-02-25 RX ORDER — POTASSIUM CHLORIDE 750 MG/1
TABLET, FILM COATED, EXTENDED RELEASE ORAL
Qty: 30 TABLET | Refills: 2 | Status: SHIPPED | OUTPATIENT
Start: 2019-02-25 | End: 2020-02-09

## 2019-02-26 ENCOUNTER — TELEPHONE (OUTPATIENT)
Dept: OTOLARYNGOLOGY | Facility: CLINIC | Age: 64
End: 2019-02-26

## 2019-02-26 RX ORDER — AMOXICILLIN AND CLAVULANATE POTASSIUM 875; 125 MG/1; MG/1
1 TABLET, FILM COATED ORAL EVERY 12 HOURS
Qty: 20 TABLET | Refills: 0 | Status: SHIPPED | OUTPATIENT
Start: 2019-02-26 | End: 2019-03-08

## 2019-02-26 NOTE — TELEPHONE ENCOUNTER
Dr. Linh Olivares per pt taking ibrutiniub and interacts with clarithromycin. Please advise on antibiotic.  Per pt does well with Augmentin, please advise

## 2019-02-26 NOTE — TELEPHONE ENCOUNTER
Pt informed that rx sent and if pt is not better , pt to contact our office. Pt verbalized understanding.

## 2019-02-26 NOTE — TELEPHONE ENCOUNTER
Per pt c/o of coughing up green/yello mucus, congestion. Pt asking of two week course of antibiotic.

## 2019-02-26 NOTE — TELEPHONE ENCOUNTER
Pt is requesting a refill on the antibiotic. Pt is spitting up greenish mucus. Pt does not want to set up the appt.   Please call

## 2019-03-01 RX ORDER — NEOMYCIN SULFATE, POLYMYXIN B SULFATE AND HYDROCORTISONE 10; 3.5; 1 MG/ML; MG/ML; [USP'U]/ML
3 SUSPENSION/ DROPS AURICULAR (OTIC) 3 TIMES DAILY
Qty: 10 ML | Refills: 1 | OUTPATIENT
Start: 2019-03-01 | End: 2019-03-11

## 2019-03-16 NOTE — TELEPHONE ENCOUNTER
No refill protocol for this med. Routed to MD for review.       Recent Visits  Date Type Provider Dept   12/05/18 Office Visit Pauly Bustillo [facility]   11/13/18 Office Visit Efren De La Rosa MD [facility]   09/18/18 Office Visit Pill

## 2019-03-18 RX ORDER — VALACYCLOVIR HYDROCHLORIDE 1 G/1
TABLET, FILM COATED ORAL
Qty: 30 TABLET | Refills: 4 | Status: SHIPPED | OUTPATIENT
Start: 2019-03-18 | End: 2019-08-02

## 2019-03-25 NOTE — TELEPHONE ENCOUNTER
Hypertensive Medications  Protocol Criteria:  · Appointment scheduled in the past 6 months or in the next 3 months  · BMP or CMP in the past 12 months  · Creatinine result < 2  Recent Outpatient Visits            3 months ago Leg edema, right    [place] [facility]

## 2019-03-27 NOTE — TELEPHONE ENCOUNTER
Pt states that he is going out of town today at 4:00 and would like a refill on his medication. Pt can be reached at [phone].

## 2019-03-28 RX ORDER — AMLODIPINE BESYLATE 2.5 MG/1
TABLET ORAL
Qty: 30 TABLET | Refills: 5 | Status: SHIPPED | OUTPATIENT
Start: 2019-03-28 | End: 2019-08-31

## 2019-04-09 ENCOUNTER — OFFICE VISIT (OUTPATIENT)
Dept: OTOLARYNGOLOGY | Facility: CLINIC | Age: 64
End: 2019-04-09
Payer: MEDICARE

## 2019-04-09 VITALS — HEART RATE: 62 BPM | DIASTOLIC BLOOD PRESSURE: 82 MMHG | SYSTOLIC BLOOD PRESSURE: 132 MMHG

## 2019-04-09 DIAGNOSIS — H65.03 NON-RECURRENT ACUTE SEROUS OTITIS MEDIA OF BOTH EARS: Primary | ICD-10-CM

## 2019-04-09 PROCEDURE — G0463 HOSPITAL OUTPT CLINIC VISIT: HCPCS | Performed by: OTOLARYNGOLOGY

## 2019-04-09 PROCEDURE — 99213 OFFICE O/P EST LOW 20 MIN: CPT | Performed by: OTOLARYNGOLOGY

## 2019-04-11 NOTE — PROGRESS NOTES
Callie Wall is a 66year old male.  Patient presents with:  Sinus Problem: pt states has sinus infection pt was on antibiotics in late feb   Ear Problem: pt state right ear still feels clogged     HPI:   He had a bad cold and sinus infection a few weeks Sleep apnea 11/20/16-Split    AHI 31 RDI 34 REM AHI 36 Supine AHI 98 non-supine AHI 17 Sao2 Nithin 81%/CPAP-8cwp/Merit      Social History:  Social History    Tobacco Use      Smoking status: Former Smoker        Packs/day: 1.00        Years: 33.00        P AND PLAN:   1. Non-recurrent acute serous otitis media of both ears  He has bilateral serous otitis media. I discussed the findings with him today. We discussed the option of myringotomy versus medications versus observation.   As he is just finished a co

## 2019-05-15 RX ORDER — MONTELUKAST SODIUM 10 MG/1
TABLET ORAL
Qty: 90 TABLET | Refills: 1 | Status: SHIPPED | OUTPATIENT
Start: 2019-05-15 | End: 2019-11-02

## 2019-05-16 RX ORDER — LORATADINE 10 MG
TABLET ORAL
Qty: 30 TABLET | Refills: 5 | OUTPATIENT
Start: 2019-05-16

## 2019-05-18 RX ORDER — LORATADINE AND PSEUDOEPHEDRINE 10; 240 MG/1; MG/1
1 TABLET, EXTENDED RELEASE ORAL DAILY
Qty: 90 TABLET | Refills: 1 | OUTPATIENT
Start: 2019-05-18 | End: 2021-02-26

## 2019-05-18 NOTE — TELEPHONE ENCOUNTER
Controlled medications pending for review. If approved needs to be called in or faxed by on site staff.     Last Rx: 3-28-17 # 60 + 3 by Kacey Martinez MD  LOV: 11-13-18    Requested Prescriptions     Pending Prescriptions Disp Refills   • Loratadine-Pseud

## 2019-07-06 RX ORDER — CHLORTHALIDONE 25 MG/1
TABLET ORAL
Qty: 90 TABLET | Refills: 1 | Status: SHIPPED | OUTPATIENT
Start: 2019-07-06 | End: 2019-12-12

## 2019-07-06 NOTE — TELEPHONE ENCOUNTER
Review pended refill request as it does not fall under a protocol.     Requested Prescriptions     Pending Prescriptions Disp Refills   • CHLORTHALIDONE 25 MG Oral Tab [Pharmacy Med Name: CHLORTHALIDONE 25 MG TABLET] 30 tablet 3     Sig: TAKE 1 TABLET BY MO

## 2019-07-26 ENCOUNTER — LAB ENCOUNTER (OUTPATIENT)
Dept: LAB | Facility: HOSPITAL | Age: 64
End: 2019-07-26
Attending: INTERNAL MEDICINE
Payer: MEDICARE

## 2019-07-26 ENCOUNTER — TELEPHONE (OUTPATIENT)
Dept: OTHER | Age: 64
End: 2019-07-26

## 2019-07-26 DIAGNOSIS — Z87.438 HISTORY OF BPH: Primary | ICD-10-CM

## 2019-07-26 DIAGNOSIS — N40.0 BENIGN PROSTATIC HYPERPLASIA, UNSPECIFIED WHETHER LOWER URINARY TRACT SYMPTOMS PRESENT: ICD-10-CM

## 2019-07-26 DIAGNOSIS — I10 ESSENTIAL HYPERTENSION: ICD-10-CM

## 2019-07-26 DIAGNOSIS — Z12.5 PROSTATE CANCER SCREENING: ICD-10-CM

## 2019-07-26 LAB
ALBUMIN SERPL-MCNC: 4 G/DL (ref 3.4–5)
ALBUMIN/GLOB SERPL: 1.6 {RATIO} (ref 1–2)
ALP LIVER SERPL-CCNC: 69 U/L (ref 45–117)
ALT SERPL-CCNC: 74 U/L (ref 16–61)
ANION GAP SERPL CALC-SCNC: 9 MMOL/L (ref 0–18)
AST SERPL-CCNC: 47 U/L (ref 15–37)
BASOPHILS # BLD AUTO: 0.02 X10(3) UL (ref 0–0.2)
BASOPHILS NFR BLD AUTO: 0.3 %
BILIRUB SERPL-MCNC: 1.1 MG/DL (ref 0.1–2)
BUN BLD-MCNC: 15 MG/DL (ref 7–18)
BUN/CREAT SERPL: 20.3 (ref 10–20)
CALCIUM BLD-MCNC: 8.4 MG/DL (ref 8.5–10.1)
CHLORIDE SERPL-SCNC: 104 MMOL/L (ref 98–112)
CHOLEST SMN-MCNC: 181 MG/DL (ref ?–200)
CO2 SERPL-SCNC: 28 MMOL/L (ref 21–32)
CREAT BLD-MCNC: 0.74 MG/DL (ref 0.7–1.3)
DEPRECATED RDW RBC AUTO: 48.6 FL (ref 35.1–46.3)
EOSINOPHIL # BLD AUTO: 0.06 X10(3) UL (ref 0–0.7)
EOSINOPHIL NFR BLD AUTO: 1 %
ERYTHROCYTE [DISTWIDTH] IN BLOOD BY AUTOMATED COUNT: 14.1 % (ref 11–15)
GLOBULIN PLAS-MCNC: 2.5 G/DL (ref 2.8–4.4)
GLUCOSE BLD-MCNC: 109 MG/DL (ref 70–99)
HCT VFR BLD AUTO: 42.9 % (ref 39–53)
HDLC SERPL-MCNC: 45 MG/DL (ref 40–59)
HGB BLD-MCNC: 14.3 G/DL (ref 13–17.5)
IMM GRANULOCYTES # BLD AUTO: 0.02 X10(3) UL (ref 0–1)
IMM GRANULOCYTES NFR BLD: 0.3 %
LDLC SERPL CALC-MCNC: 103 MG/DL (ref ?–100)
LYMPHOCYTES # BLD AUTO: 2.49 X10(3) UL (ref 1–4)
LYMPHOCYTES NFR BLD AUTO: 42.3 %
M PROTEIN MFR SERPL ELPH: 6.5 G/DL (ref 6.4–8.2)
MCH RBC QN AUTO: 31 PG (ref 26–34)
MCHC RBC AUTO-ENTMCNC: 33.3 G/DL (ref 31–37)
MCV RBC AUTO: 93.1 FL (ref 80–100)
MONOCYTES # BLD AUTO: 0.57 X10(3) UL (ref 0.1–1)
MONOCYTES NFR BLD AUTO: 9.7 %
NEUTROPHILS # BLD AUTO: 2.72 X10 (3) UL (ref 1.5–7.7)
NEUTROPHILS # BLD AUTO: 2.72 X10(3) UL (ref 1.5–7.7)
NEUTROPHILS NFR BLD AUTO: 46.4 %
NONHDLC SERPL-MCNC: 136 MG/DL (ref ?–130)
OSMOLALITY SERPL CALC.SUM OF ELEC: 293 MOSM/KG (ref 275–295)
PATIENT FASTING: YES
PATIENT FASTING: YES
PLATELET # BLD AUTO: 129 10(3)UL (ref 150–450)
POTASSIUM SERPL-SCNC: 3.7 MMOL/L (ref 3.5–5.1)
PSA SERPL-MCNC: 0.86 NG/ML (ref ?–4)
RBC # BLD AUTO: 4.61 X10(6)UL (ref 4.3–5.7)
SODIUM SERPL-SCNC: 141 MMOL/L (ref 136–145)
TRIGL SERPL-MCNC: 163 MG/DL (ref 30–149)
TSI SER-ACNC: 1.51 MIU/ML (ref 0.36–3.74)
VLDLC SERPL CALC-MCNC: 33 MG/DL (ref 0–30)
WBC # BLD AUTO: 5.9 X10(3) UL (ref 4–11)

## 2019-07-26 PROCEDURE — 85025 COMPLETE CBC W/AUTO DIFF WBC: CPT

## 2019-07-26 PROCEDURE — 36415 COLL VENOUS BLD VENIPUNCTURE: CPT

## 2019-07-26 PROCEDURE — 80061 LIPID PANEL: CPT

## 2019-07-26 PROCEDURE — 84153 ASSAY OF PSA TOTAL: CPT

## 2019-07-26 PROCEDURE — 80053 COMPREHEN METABOLIC PANEL: CPT

## 2019-07-26 PROCEDURE — 84443 ASSAY THYROID STIM HORMONE: CPT

## 2019-07-26 NOTE — TELEPHONE ENCOUNTER
I called pt. With information and he understood. I talked to Dr. Femi George and we were able to add it to blood work that was done today. Pt. Was informed.

## 2019-07-26 NOTE — TELEPHONE ENCOUNTER
Patient already has a urologist who he is seen. It does not make sense that I ordered the PSA and fax it to his urologist office.   The urologist's office needs to do the test.  Next

## 2019-07-26 NOTE — TELEPHONE ENCOUNTER
Patient calling and states that he did his blood tests earlier but he misplaced the blood test order for her PSA, requesting PCP to order it again, noted PSA was ordered on 2/5/2018,requesting to fax the order to his Urology MD's office at fax [phone]

## 2019-08-02 RX ORDER — VALACYCLOVIR HYDROCHLORIDE 1 G/1
TABLET, FILM COATED ORAL
Qty: 30 TABLET | Refills: 5 | Status: SHIPPED | OUTPATIENT
Start: 2019-08-02 | End: 2021-02-26

## 2019-08-02 NOTE — TELEPHONE ENCOUNTER
Review pended refill request as it does not fall under a protocol.     Last Rx: 3/18/19  LOV: 12/5/18

## 2019-08-26 ENCOUNTER — OFFICE VISIT (OUTPATIENT)
Dept: INTERNAL MEDICINE CLINIC | Facility: CLINIC | Age: 64
End: 2019-08-26
Payer: MEDICARE

## 2019-08-26 ENCOUNTER — APPOINTMENT (OUTPATIENT)
Dept: LAB | Age: 64
End: 2019-08-26
Attending: INTERNAL MEDICINE
Payer: MEDICARE

## 2019-08-26 VITALS
DIASTOLIC BLOOD PRESSURE: 69 MMHG | HEIGHT: 68.5 IN | RESPIRATION RATE: 16 BRPM | BODY MASS INDEX: 37.6 KG/M2 | HEART RATE: 66 BPM | WEIGHT: 251 LBS | SYSTOLIC BLOOD PRESSURE: 129 MMHG

## 2019-08-26 DIAGNOSIS — Z00.00 MEDICARE ANNUAL WELLNESS VISIT, INITIAL: Primary | ICD-10-CM

## 2019-08-26 DIAGNOSIS — C91.10 CLL (CHRONIC LYMPHOCYTIC LEUKEMIA) (HCC): ICD-10-CM

## 2019-08-26 DIAGNOSIS — J32.0 CHRONIC MAXILLARY SINUSITIS: ICD-10-CM

## 2019-08-26 DIAGNOSIS — H69.83 EUSTACHIAN TUBE DYSFUNCTION, BILATERAL: ICD-10-CM

## 2019-08-26 DIAGNOSIS — L40.50 PSORIATIC ARTHROPATHY (HCC): ICD-10-CM

## 2019-08-26 DIAGNOSIS — K76.89 LIVER DYSFUNCTION: ICD-10-CM

## 2019-08-26 DIAGNOSIS — Z11.59 NEED FOR HEPATITIS C SCREENING TEST: ICD-10-CM

## 2019-08-26 DIAGNOSIS — Z00.00 ENCOUNTER FOR ANNUAL HEALTH EXAMINATION: ICD-10-CM

## 2019-08-26 DIAGNOSIS — I10 ESSENTIAL HYPERTENSION: ICD-10-CM

## 2019-08-26 DIAGNOSIS — G47.33 OBSTRUCTIVE SLEEP APNEA SYNDROME: ICD-10-CM

## 2019-08-26 DIAGNOSIS — F31.62 BIPOLAR DISORDER, CURRENT EPISODE MIXED, MODERATE (HCC): ICD-10-CM

## 2019-08-26 DIAGNOSIS — N40.0 BENIGN PROSTATIC HYPERPLASIA, UNSPECIFIED WHETHER LOWER URINARY TRACT SYMPTOMS PRESENT: ICD-10-CM

## 2019-08-26 DIAGNOSIS — E66.01 SEVERE OBESITY (BMI 35.0-39.9) WITH COMORBIDITY (HCC): Chronic | ICD-10-CM

## 2019-08-26 PROBLEM — R60.0 LEG EDEMA, RIGHT: Status: RESOLVED | Noted: 2018-11-13 | Resolved: 2019-08-26

## 2019-08-26 PROBLEM — B00.2 ORAL HERPES SIMPLEX INFECTION: Status: RESOLVED | Noted: 2018-03-08 | Resolved: 2019-08-26

## 2019-08-26 PROBLEM — H69.81 DYSFUNCTION OF RIGHT EUSTACHIAN TUBE: Status: RESOLVED | Noted: 2018-11-13 | Resolved: 2019-08-26

## 2019-08-26 PROBLEM — H69.93 EUSTACHIAN TUBE DYSFUNCTION, BILATERAL: Status: ACTIVE | Noted: 2018-11-13

## 2019-08-26 PROBLEM — H69.91 DYSFUNCTION OF RIGHT EUSTACHIAN TUBE: Status: RESOLVED | Noted: 2018-11-13 | Resolved: 2019-08-26

## 2019-08-26 PROBLEM — B36.9 DERMATOMYCOSIS: Status: RESOLVED | Noted: 2017-12-28 | Resolved: 2019-08-26

## 2019-08-26 LAB
ALBUMIN SERPL-MCNC: 4.1 G/DL (ref 3.4–5)
ALBUMIN/GLOB SERPL: 1.5 {RATIO} (ref 1–2)
ALP LIVER SERPL-CCNC: 76 U/L (ref 45–117)
ALT SERPL-CCNC: 34 U/L (ref 16–61)
ANION GAP SERPL CALC-SCNC: 9 MMOL/L (ref 0–18)
AST SERPL-CCNC: 20 U/L (ref 15–37)
BILIRUB SERPL-MCNC: 0.8 MG/DL (ref 0.1–2)
BUN BLD-MCNC: 15 MG/DL (ref 7–18)
BUN/CREAT SERPL: 17.4 (ref 10–20)
CALCIUM BLD-MCNC: 9.3 MG/DL (ref 8.5–10.1)
CHLORIDE SERPL-SCNC: 103 MMOL/L (ref 98–112)
CO2 SERPL-SCNC: 32 MMOL/L (ref 21–32)
CREAT BLD-MCNC: 0.86 MG/DL (ref 0.7–1.3)
GLOBULIN PLAS-MCNC: 2.7 G/DL (ref 2.8–4.4)
GLUCOSE BLD-MCNC: 103 MG/DL (ref 70–99)
HCV AB SERPL QL IA: NONREACTIVE
M PROTEIN MFR SERPL ELPH: 6.8 G/DL (ref 6.4–8.2)
OSMOLALITY SERPL CALC.SUM OF ELEC: 299 MOSM/KG (ref 275–295)
PATIENT FASTING: YES
POTASSIUM SERPL-SCNC: 3.9 MMOL/L (ref 3.5–5.1)
SODIUM SERPL-SCNC: 144 MMOL/L (ref 136–145)

## 2019-08-26 PROCEDURE — G0438 PPPS, INITIAL VISIT: HCPCS | Performed by: INTERNAL MEDICINE

## 2019-08-26 PROCEDURE — 36415 COLL VENOUS BLD VENIPUNCTURE: CPT

## 2019-08-26 PROCEDURE — 80053 COMPREHEN METABOLIC PANEL: CPT

## 2019-08-26 PROCEDURE — G0447 BEHAVIOR COUNSEL OBESITY 15M: HCPCS | Performed by: INTERNAL MEDICINE

## 2019-08-26 PROCEDURE — 86803 HEPATITIS C AB TEST: CPT

## 2019-08-26 NOTE — PROGRESS NOTES
HPI:   Viktor Ceja is a 66year old male who presents for a Medicare Initial Annual Wellness visit (Once after 12 month Medicare anniversary) .       His last annual assessment has been over 1 year: Annual Physical due on 10/06/1960         Fall/Risk information         He smoked tobacco in the past but quit greater than 12 months ago.   Social History    Tobacco Use      Smoking status: Former Smoker        Packs/day: 1.00        Years: 33.00        Pack years: 33        Types: Cigarettes        Start allergic to bactrim [sulfamethoxazole w/trimethoprim].     CURRENT MEDICATIONS:     Outpatient Medications Marked as Taking for the 8/26/19 encounter (Office Visit) with Alexx Betts MD:  AMLODIPINE BESYLATE 2.5 MG Oral Tab TAKE 1 TABLET BY MOUTH EVERY DA complaint of urinary incontinence  MUSCULOSKELETAL: denies back pain  NEURO: denies headaches  PSYCHE: denies depression or anxiety  HEMATOLOGIC: denies hx of anemia  ENDOCRINE: denies thyroid history  ALL/ASTHMA: denies hx of allergy or asthma  EXAM:   BP Left Eye Visual Acuity: Corrected Left Eye Chart Acuity: 20/20   Both Eyes Visual Acuity: Corrected Both Eyes Chart Acuity: 20/20   Able To Tolerate Visual Acuity: Yes      Physical Exam   Nursing note and vitals reviewed.    Constitutional: He is oriente current episode mixed, moderate (HCC)    BPH (benign prostatic hyperplasia)     Managed per at [facility]. Continue on tamsulosin and finasteride.   PSA recently completed look normal.         Chronic maxillary sinusitis     Recurrent episodes of maxilla lesion on his knee which looks like support ptosis. He has been referred to dermatology–Dr. Mera Mesa, [phone] for an appointment. No cervical, axillary, inguinal lymphadenopathy. Hernial orifices intact.   Rectal exam normal,prostate normal to palpation with comorbidity ([facility] [address]    Essential hypertension    Obstructive sleep apnea syndrome    Benign prostatic hyperplasia, unspecified whether lower urinary tract symptoms present    Psoriatic arthropathy (HCC)    Bipolar disorder, current episode mixed, modera Colorectal Cancer Screening      Colonoscopy Screen every 10 years Colonoscopy due on 10/06/2007 Update [facility] if applicable    Flex Sigmoidoscopy Screen every 10 years No results found for this or any previous visit. No flowsheet data found. Creatinine  Annually Creatinine (mg/dL)   Date Value   07/26/2019 0.74    No flowsheet data found. Drug Serum Conc  Annually No results found for: DIGOXIN, DIG, VALP No flowsheet data found.           Jose Madden is a 66year old male with a Body mas

## 2019-08-26 NOTE — ASSESSMENT & PLAN NOTE
Blood pressure 129/69, pulse 66, resp. rate 16, height 5' 8.5\" (1.74 m), weight 251 lb (113.9 kg). Stable blood pressure, well controlled on amlodipine at 2.5 mg daily. Renal functions remain stable.

## 2019-08-26 NOTE — ASSESSMENT & PLAN NOTE
Patient has been treated for CLL through [address] [place] [address]. Between 2009 and 2012 he was on observation. He was then started on Rituxan in 2012 which he took at 6-month intervals.   He developed worsening anemia and repeat bone marrow

## 2019-08-26 NOTE — ASSESSMENT & PLAN NOTE
Patient has been managed at [facility] for psoriatic arthropathy. Inflammatory polyarthritis secondary to psoriasis seems stable.   He does have scattered skin lesions which seems stable with local cream.  Advised to follow-up with dermatology for an carlos

## 2019-08-26 NOTE — ASSESSMENT & PLAN NOTE
Normal exam.  Labs as ordered. Skin check– multiple nevi present. Significant freckling of skin noted. Patient is concerned about a lesion on his knee which looks like support ptosis.   He has been referred to dermatology–Dr. Chinyere Toribio, [phone] for an ap

## 2019-08-26 NOTE — PATIENT INSTRUCTIONS
Problem List Items Addressed This Visit        Unprioritized    Bipolar disorder, current episode mixed, moderate (HCC)    BPH (benign prostatic hyperplasia)     Managed per at [facility]. Continue on tamsulosin and finasteride.   PSA recently completed nevi present. Significant freckling of skin noted. Patient is concerned about a lesion on his knee which looks like support ptosis. He has been referred to dermatology–Dr. Td Jarquin, [phone] for an appointment.   No cervical, axillary, inguinal lymphaden this list are recommended by your physician but may not be covered, or covered at this frequency, by your insurer. Please check with your insurance carrier before scheduling to verify coverage.     PREVENTATIVE SERVICES  INDICATIONS AND SCHEDULE Internal [facility] Maintenance if applicable    Flex Sigmoidoscopy Screen  Covered every 5 years No results found for this or any previous visit. No flowsheet data found.      Fecal Occult Blood   Covered Annually No results found for: FOB, OCCULTSTOOL No flowsheet carson orders found for this or any previous visit. This may be covered with your pharmacy  prescription benefits     Recommended Websites for Advanced Directives    [email or web address] org/publications/[email or web address] pdf  An information packet, including Félix Brandon Trigs LDL Cholesterol (mg/dL)   Date Value   07/26/2019 103 (H)     Cholesterol, Total (mg/dL)   Date Value   07/26/2019 181     Triglycerides (mg/dL)   Date Value   07/26/2019 163 (H)        EKG - covered if needed at [employer], and non-screenin any previous visit. Please get once after your 65th birthday    Pneumococcal 23 (Pneumovax)  Covered Once after 65 No orders found for this or any previous visit.  Please get once after your 65th birthday    Hepatitis B for Moderate/High Risk No orders foun

## 2019-08-26 NOTE — ASSESSMENT & PLAN NOTE
Managed per at [facility]. Continue on tamsulosin and finasteride.   PSA recently completed look normal.

## 2019-08-26 NOTE — ASSESSMENT & PLAN NOTE
Strict diet control with portion size restriction, restriction of desserts, starches, fried foods and fatty foods as discussed. Exercise for about 20 minutes daily. Walk a bike as directed.

## 2019-08-26 NOTE — ASSESSMENT & PLAN NOTE
History of chronic sinus infections as well as eustachian tube dysfunction especially of the right side. He has been seen by Dr. Constance Zuniga for management. Continues to have some erythema of the eardrum. No discharge noted external auditory meatus.

## 2019-08-26 NOTE — ASSESSMENT & PLAN NOTE
Patient has been on CPAP. He has been doing well on the machine. He has started exercising and has been losing weight. Will consider repeat CPAP titration study if he loses about 25 to 30 pounds.

## 2019-08-26 NOTE — ASSESSMENT & PLAN NOTE
Recurrent episodes of maxillary sinusitis. Continue on allergy medications–such as eating and Flonase. Follow-up with ENT as directed.

## 2019-08-29 ENCOUNTER — OFFICE VISIT (OUTPATIENT)
Dept: OTOLARYNGOLOGY | Facility: CLINIC | Age: 64
End: 2019-08-29
Payer: MEDICARE

## 2019-08-29 VITALS
DIASTOLIC BLOOD PRESSURE: 80 MMHG | TEMPERATURE: 98 F | HEIGHT: 68.5 IN | WEIGHT: 251 LBS | BODY MASS INDEX: 37.6 KG/M2 | SYSTOLIC BLOOD PRESSURE: 127 MMHG

## 2019-08-29 DIAGNOSIS — H66.004 RECURRENT ACUTE SUPPURATIVE OTITIS MEDIA OF RIGHT EAR WITHOUT SPONTANEOUS RUPTURE OF TYMPANIC MEMBRANE: Primary | ICD-10-CM

## 2019-08-29 PROCEDURE — 99213 OFFICE O/P EST LOW 20 MIN: CPT | Performed by: OTOLARYNGOLOGY

## 2019-08-29 PROCEDURE — G0463 HOSPITAL OUTPT CLINIC VISIT: HCPCS | Performed by: OTOLARYNGOLOGY

## 2019-08-29 RX ORDER — AMOXICILLIN AND CLAVULANATE POTASSIUM 875; 125 MG/1; MG/1
1 TABLET, FILM COATED ORAL EVERY 12 HOURS
Qty: 20 TABLET | Refills: 0 | Status: SHIPPED | OUTPATIENT
Start: 2019-08-29 | End: 2021-02-26 | Stop reason: ALTCHOICE

## 2019-08-29 RX ORDER — OFLOXACIN 3 MG/ML
4 SOLUTION AURICULAR (OTIC) 2 TIMES DAILY
Qty: 1 BOTTLE | Refills: 0 | Status: SHIPPED | OUTPATIENT
Start: 2019-08-29 | End: 2019-09-08

## 2019-08-29 RX ORDER — LAMOTRIGINE 200 MG/1
TABLET ORAL
Refills: 3 | COMMUNITY
Start: 2019-08-02 | End: 2021-02-26

## 2019-08-29 NOTE — PROGRESS NOTES
Osiris Guerrero is a 66year old male. Patient presents with:  Hearing Loss: bilateral   Ear Problem: fluid in the right ear     HPI:   Is been experiencing discomfort in his right ear over the last few days. He has had persistent fullness since April.   H mg. 1 tablet p.o. q. at bedtime ) Disp: 30 tablet Rfl: 2      Past Medical History:   Diagnosis Date   • Arthritis    • [facility]    • BPH (benign prostatic hyperplasia)    • Leukemia ([facility]    • Leukemia ([facility] 75.)    • Lipid screening 07/24/2009 Orientation - Oriented to time, place, person & situation. Appropriate mood and affect. Lymph Detail Normal Submental. Submandibular. Anterior cervical. Posterior cervical. Supraclavicular.    Eyes Normal Conjunctiva - Right: Normal, Left: Normal. Pupil -

## 2019-08-31 RX ORDER — AMLODIPINE BESYLATE 2.5 MG/1
TABLET ORAL
Qty: 90 TABLET | Refills: 1 | Status: SHIPPED | OUTPATIENT
Start: 2019-08-31 | End: 2021-02-26

## 2019-08-31 NOTE — TELEPHONE ENCOUNTER
Review pended refill request as it does not fall under a protocol.   Requested Prescriptions     Pending Prescriptions Disp Refills   • AMLODIPINE BESYLATE 2.5 MG Oral Tab [Pharmacy Med Name: AMLODIPINE BESYLATE 2.5 MG TAB] 30 tablet 5     Sig: TAKE 1 TABLE

## 2019-09-13 ENCOUNTER — OFFICE VISIT (OUTPATIENT)
Dept: OTOLARYNGOLOGY | Facility: CLINIC | Age: 64
End: 2019-09-13
Payer: MEDICARE

## 2019-09-13 ENCOUNTER — OFFICE VISIT (OUTPATIENT)
Dept: AUDIOLOGY | Facility: CLINIC | Age: 64
End: 2019-09-13
Payer: MEDICARE

## 2019-09-13 VITALS
BODY MASS INDEX: 37.6 KG/M2 | WEIGHT: 251 LBS | TEMPERATURE: 98 F | DIASTOLIC BLOOD PRESSURE: 70 MMHG | SYSTOLIC BLOOD PRESSURE: 126 MMHG | HEIGHT: 68.5 IN

## 2019-09-13 DIAGNOSIS — H90.3 SENSORINEURAL HEARING LOSS, BILATERAL: Primary | ICD-10-CM

## 2019-09-13 DIAGNOSIS — H66.004 RECURRENT ACUTE SUPPURATIVE OTITIS MEDIA OF RIGHT EAR WITHOUT SPONTANEOUS RUPTURE OF TYMPANIC MEMBRANE: Primary | ICD-10-CM

## 2019-09-13 DIAGNOSIS — H90.3 SENSORINEURAL HEARING LOSS (SNHL) OF BOTH EARS: ICD-10-CM

## 2019-09-13 PROCEDURE — G0463 HOSPITAL OUTPT CLINIC VISIT: HCPCS | Performed by: OTOLARYNGOLOGY

## 2019-09-13 PROCEDURE — 92567 TYMPANOMETRY: CPT | Performed by: AUDIOLOGIST

## 2019-09-13 PROCEDURE — 92557 COMPREHENSIVE HEARING TEST: CPT | Performed by: AUDIOLOGIST

## 2019-09-13 PROCEDURE — 99213 OFFICE O/P EST LOW 20 MIN: CPT | Performed by: OTOLARYNGOLOGY

## 2019-09-13 NOTE — PROGRESS NOTES
Isabelle Cowden is a 66year old male. Patient presents with: Follow - Up: 2 week follow up- recurrent ear infections-per pt there is improvemnet of symptoms    HPI:   He is in follow-up of right-sided otitis media. He feels like he is doing much better. 875-125 MG Oral Tab Take 1 tablet by mouth every 12 (twelve) hours.  Disp: 20 tablet Rfl: 0      Past Medical History:   Diagnosis Date   • Arthritis    • B[facility] affective ([facility]    • BPH (benign prostatic hyperplasia)    • Leukemia ([facility]    • Leukemia ([facility]. - Normal.   Psychiatric Normal Orientation - Oriented to time, place, person & situation. Appropriate mood and affect. Lymph Detail Normal Submental. Submandibular. Anterior cervical. Posterior cervical. Supraclavicular.    Eyes Normal Conjunctiva - Right

## 2019-09-17 NOTE — PROGRESS NOTES
AUDIOLOGY REPORT      Felix Manning is a 66year old male     Referring Provider: Bárbara Purcell   YOB: 1955  Medical Record: [id number]      Patient Hearing History:  Patient was referred by Dr. Diogo Royal for hearing testing.    He has recently

## 2019-10-18 ENCOUNTER — OFFICE VISIT (OUTPATIENT)
Dept: DERMATOLOGY CLINIC | Facility: CLINIC | Age: 64
End: 2019-10-18
Payer: MEDICARE

## 2019-10-18 DIAGNOSIS — L40.0 PSORIASIS VULGARIS: ICD-10-CM

## 2019-10-18 DIAGNOSIS — D23.4 BENIGN NEOPLASM OF SCALP AND SKIN OF NECK: ICD-10-CM

## 2019-10-18 DIAGNOSIS — D23.30 BENIGN NEOPLASM OF SKIN OF FACE: ICD-10-CM

## 2019-10-18 DIAGNOSIS — D48.5 NEOPLASM OF UNCERTAIN BEHAVIOR OF SKIN: Primary | ICD-10-CM

## 2019-10-18 DIAGNOSIS — D23.5 BENIGN NEOPLASM OF SKIN OF TRUNK, EXCEPT SCROTUM: ICD-10-CM

## 2019-10-18 DIAGNOSIS — D22.9 MULTIPLE NEVI: ICD-10-CM

## 2019-10-18 DIAGNOSIS — D23.70 BENIGN NEOPLASM OF SKIN OF LOWER LIMB, INCLUDING HIP, UNSPECIFIED LATERALITY: ICD-10-CM

## 2019-10-18 DIAGNOSIS — D23.60 BENIGN NEOPLASM OF SKIN OF UPPER LIMB, INCLUDING SHOULDER, UNSPECIFIED LATERALITY: ICD-10-CM

## 2019-10-18 DIAGNOSIS — L71.9 ROSACEA: ICD-10-CM

## 2019-10-18 PROCEDURE — 11102 TANGNTL BX SKIN SINGLE LES: CPT | Performed by: DERMATOLOGY

## 2019-10-18 PROCEDURE — 99203 OFFICE O/P NEW LOW 30 MIN: CPT | Performed by: DERMATOLOGY

## 2019-10-18 PROCEDURE — G0463 HOSPITAL OUTPT CLINIC VISIT: HCPCS | Performed by: DERMATOLOGY

## 2019-10-18 PROCEDURE — 88305 TISSUE EXAM BY PATHOLOGIST: CPT | Performed by: DERMATOLOGY

## 2019-10-18 RX ORDER — SULFAMETHOXAZOLE AND TRIMETHOPRIM 800; 160 MG/1; MG/1
TABLET ORAL
COMMUNITY
Start: 2019-09-25 | End: 2021-02-26

## 2019-10-18 RX ORDER — METOPROLOL SUCCINATE 25 MG/1
25 TABLET, EXTENDED RELEASE ORAL
Refills: 3 | COMMUNITY
Start: 2019-09-26 | End: 2021-02-26

## 2019-10-18 RX ORDER — CLOBETASOL PROPIONATE 0.5 MG/G
CREAM TOPICAL
Refills: 2 | COMMUNITY
Start: 2019-09-09

## 2019-10-18 RX ORDER — METRONIDAZOLE 7.5 MG/G
GEL TOPICAL
Qty: 60 G | Refills: 11 | Status: SHIPPED | OUTPATIENT
Start: 2019-10-18

## 2019-10-22 NOTE — PROGRESS NOTES
The pathology report from last visit showed right knee-Verruca vulgaris . Please log in test results, send biopsy results letter. Pt to rtc 1 year or prn.

## 2019-10-28 NOTE — PROGRESS NOTES
Felix Manning is a 60year old male. HPI:     CC:  Patient presents with:  Full Skin Exam: New pt. pt presenting today for full body check. Denies family and personal HX of skin cancer.         Allergies:  Bactrim [Sulfamethoxazole W/Trimethoprim]    HIS EVERY DAY, Disp: 90 tablet, Rfl: 1  lamoTRIgine 200 MG Oral Tab, TAKE 1 TABLET BY MOUTH EVERYDAY AT BEDTIME, Disp: , Rfl: 3  CHLORTHALIDONE 25 MG Oral Tab, TAKE 1 TABLET BY MOUTH EVERY DAY, Disp: 90 tablet, Rfl: 1  Loratadine-Pseudoephedrine ER (CLARITIN-D Supine AHI 98 non-supine AHI 17 Sao2 Nithin 81%/CPAP-8cwp/[facility]     Past Surgical History:   Procedure Laterality Date   • OTHER SURGICAL HISTORY  2000    Nose surgery     Social History    Socioeconomic History      Marital status:       Spouse name Asked        Caffeine Concern: Yes          Coffee, 1 cup daily.         Occupational Exposure: Not Asked        Hobby Hazards: Not Asked        Sleep Concern: Not Asked        Stress Concern: Not Asked        Weight Concern: Not Asked        Special Diet: performed, including scalp, head, neck, face,nails, hair, external eyes, including conjunctival mucosa, eyelids, lips external ears, back, chest,/ breasts, axillae,  abdomen, arms, legs, palms.      Multiple light to medium brown, well marginated, uniformly with medications and grid. Overall skincare, liberal use of emollients discussed. Consider more aggressive therapy if worsening. Patient will let us know how they are doing over the next several weeks. Await clinical response to above therapy.   Recheck i

## 2019-10-28 NOTE — PROGRESS NOTES
Operative Report                     Shave/  Tangential biopsy     Clinical diagnosis:    Size of lesion:    Location:Diagnosis/Clinical History: pt with hx cll/ psoriasis verrucoid plaque  Spec 1 Description >>>>>: right knee  Spec 1 Comment: yobanyc

## 2019-11-02 RX ORDER — MONTELUKAST SODIUM 10 MG/1
TABLET ORAL
Qty: 90 TABLET | Refills: 1 | Status: SHIPPED | OUTPATIENT
Start: 2019-11-02 | End: 2021-02-26

## 2019-11-08 ENCOUNTER — OFFICE VISIT (OUTPATIENT)
Dept: OTOLARYNGOLOGY | Facility: CLINIC | Age: 64
End: 2019-11-08
Payer: MEDICARE

## 2019-11-08 VITALS
TEMPERATURE: 97 F | HEIGHT: 69.5 IN | WEIGHT: 251 LBS | DIASTOLIC BLOOD PRESSURE: 78 MMHG | BODY MASS INDEX: 36.34 KG/M2 | SYSTOLIC BLOOD PRESSURE: 114 MMHG

## 2019-11-08 DIAGNOSIS — J01.91 ACUTE RECURRENT SINUSITIS, UNSPECIFIED LOCATION: Primary | ICD-10-CM

## 2019-11-08 PROCEDURE — G0463 HOSPITAL OUTPT CLINIC VISIT: HCPCS | Performed by: OTOLARYNGOLOGY

## 2019-11-08 PROCEDURE — 99213 OFFICE O/P EST LOW 20 MIN: CPT | Performed by: OTOLARYNGOLOGY

## 2019-11-08 RX ORDER — APIXABAN 5 MG/1
5 TABLET, FILM COATED ORAL 2 TIMES DAILY
Refills: 1 | COMMUNITY
Start: 2019-10-23 | End: 2021-02-26

## 2019-11-08 RX ORDER — SULFAMETHOXAZOLE AND TRIMETHOPRIM 400; 80 MG/1; MG/1
1 TABLET ORAL 2 TIMES DAILY
COMMUNITY
End: 2021-02-26

## 2019-11-08 RX ORDER — AMOXICILLIN AND CLAVULANATE POTASSIUM 875; 125 MG/1; MG/1
1 TABLET, FILM COATED ORAL EVERY 12 HOURS
Qty: 20 TABLET | Refills: 0 | Status: SHIPPED | OUTPATIENT
Start: 2019-11-08 | End: 2021-02-26 | Stop reason: ALTCHOICE

## 2019-11-08 NOTE — PROGRESS NOTES
Miki Travis is a 60year old male.  Patient presents with:  Sinus Problem: sinus congestion, sinus headache, sore throat for 3 days, currently taking antibiotic    HPI:   Ears are doing much better but he is been expensing facial pressure and congestion (PROSCAR) 5 MG Oral Tab Take 5 mg by mouth daily. • ELIQUIS 5 MG Oral Tab Take 5 mg by mouth 2 (two) times daily. 1   • Amoxicillin-Pot Clavulanate 875-125 MG Oral Tab Take 1 tablet by mouth every 12 (twelve) hours.  20 tablet 0   • Loratadine-Pseudo Normal External nose - Normal. Nasal septum - Normal, Turbinates - Normal mucopurulent drainage   Neurological Normal Memory - Normal. Cranial nerves - Cranial nerves II through XII grossly intact.    Neck Exam Normal Inspection - Normal. Palpation - Normal

## 2019-12-04 NOTE — ED NOTES
C/o headache and neck pain. Star Wedge Flap Text: The defect edges were debeveled with a #15 scalpel blade.  Given the location of the defect, shape of the defect and the proximity to free margins a star wedge flap was deemed most appropriate.  Using a sterile surgical marker, an appropriate rotation flap was drawn incorporating the defect and placing the expected incisions within the relaxed skin tension lines where possible. The area thus outlined was incised deep to adipose tissue with a #15 scalpel blade.  The skin margins were undermined to an appropriate distance in all directions utilizing iris scissors.

## 2019-12-12 RX ORDER — CHLORTHALIDONE 25 MG/1
TABLET ORAL
Qty: 90 TABLET | Refills: 1 | Status: SHIPPED | OUTPATIENT
Start: 2019-12-12 | End: 2020-06-03

## 2019-12-12 NOTE — TELEPHONE ENCOUNTER
Refill passed per [facility] protocol.   Hypertensive Medications  Protocol Criteria:  · Appointment scheduled in the past 6 months or in the next 3 months  · BMP or CMP in the past 12 months  · Creatinine result < 2  Recent Outpatient Visits

## 2020-01-29 ENCOUNTER — OFFICE VISIT (OUTPATIENT)
Dept: OTOLARYNGOLOGY | Facility: CLINIC | Age: 65
End: 2020-01-29
Payer: MEDICARE

## 2020-01-29 VITALS
BODY MASS INDEX: 37.33 KG/M2 | HEIGHT: 69 IN | SYSTOLIC BLOOD PRESSURE: 114 MMHG | WEIGHT: 252 LBS | TEMPERATURE: 98 F | DIASTOLIC BLOOD PRESSURE: 72 MMHG

## 2020-01-29 DIAGNOSIS — H66.004 RECURRENT ACUTE SUPPURATIVE OTITIS MEDIA OF RIGHT EAR WITHOUT SPONTANEOUS RUPTURE OF TYMPANIC MEMBRANE: ICD-10-CM

## 2020-01-29 DIAGNOSIS — J32.9 CHRONIC SINUSITIS, UNSPECIFIED LOCATION: Primary | ICD-10-CM

## 2020-01-29 PROCEDURE — 99214 OFFICE O/P EST MOD 30 MIN: CPT | Performed by: OTOLARYNGOLOGY

## 2020-01-29 PROCEDURE — G0463 HOSPITAL OUTPT CLINIC VISIT: HCPCS | Performed by: OTOLARYNGOLOGY

## 2020-01-29 RX ORDER — AMOXICILLIN AND CLAVULANATE POTASSIUM 875; 125 MG/1; MG/1
1 TABLET, FILM COATED ORAL 2 TIMES DAILY
Qty: 28 TABLET | Refills: 1 | Status: SHIPPED | OUTPATIENT
Start: 2020-01-29 | End: 2020-02-12

## 2020-01-29 RX ORDER — PREDNISONE 20 MG/1
TABLET ORAL
Qty: 9 TABLET | Refills: 0 | Status: SHIPPED | OUTPATIENT
Start: 2020-01-29 | End: 2021-02-26

## 2020-01-29 RX ORDER — ASPIRIN 81 MG/1
81 TABLET ORAL
COMMUNITY
End: 2021-05-11

## 2020-01-29 NOTE — PROGRESS NOTES
Whitney Walker is a 60year old male. Patient presents with:  Sinus Problem: sinus congestion for 1 month      HISTORY OF PRESENT ILLNESS  10/28/2017  Patient prevents for evaluation of sinusitis.   He feels terrible he has had for day history of facial p 1970        Quit date: 2015        Years since quittin.6      Smokeless tobacco: Never Used      Tobacco comment: quit on and off for 10 yrs. prioir to , smoked 0.5-1 pk a day    Substance and Sexual Activity      Alcohol use:  Yes        A Constitutional Normal Overall appearance - Normal.   Psychiatric Normal Orientation - Oriented to time, place, person & situation. Appropriate mood and affect. Neck Exam Normal Inspection - Normal. Palpation - Normal without lymphadenopathy.  Parot A DAY AS NEEDED, Disp: , Rfl: 2  •  Metoprolol Succinate ER 25 MG Oral Tablet 24 Hr, Take 25 mg by mouth once daily. , Disp: , Rfl: 3  •  Sulfamethoxazole-TMP -160 MG Oral Tab per tablet, Take one tablet by mouth every Saturday and Sunday, Disp: , Rfl Reported on 1/29/2020 ), Disp: 90 tablet, Rfl: 1  •  cetirizine 10 MG Oral Tab, Take 1 tablet (10 mg total) by mouth daily.  (Patient not taking: Reported on 1/29/2020 ), Disp: 30 tablet, Rfl: 3  •  Fluticasone Propionate 50 MCG/ACT Nasal Suspension, SPRAY

## 2020-01-31 ENCOUNTER — HOSPITAL ENCOUNTER (OUTPATIENT)
Dept: BONE DENSITY | Age: 65
Discharge: HOME | End: 2020-01-31
Attending: FAMILY MEDICINE

## 2020-01-31 DIAGNOSIS — Z13.9 ENCOUNTER FOR SCREENING: ICD-10-CM

## 2020-02-09 RX ORDER — POTASSIUM CHLORIDE 750 MG/1
TABLET, FILM COATED, EXTENDED RELEASE ORAL
Qty: 30 TABLET | Refills: 2 | Status: SHIPPED | OUTPATIENT
Start: 2020-02-09 | End: 2021-02-26

## 2020-02-09 NOTE — TELEPHONE ENCOUNTER
Review pended refill request as it does not fall under a protocol.     Last Rx: 12/6/18  Requested Prescriptions     Pending Prescriptions Disp Refills   • POTASSIUM CHLORIDE ER 10 MEQ Oral Tab CR [Pharmacy Med Name: POTASSIUM CL ER 10 MEQ TABLET] 30 tablet

## 2020-06-03 RX ORDER — CHLORTHALIDONE 25 MG/1
TABLET ORAL
Qty: 90 TABLET | Refills: 1 | Status: SHIPPED | OUTPATIENT
Start: 2020-06-03 | End: 2020-11-23

## 2020-06-22 ENCOUNTER — TELEPHONE (OUTPATIENT)
Dept: INTERNAL MEDICINE CLINIC | Facility: CLINIC | Age: 65
End: 2020-06-22

## 2020-06-22 DIAGNOSIS — D84.9 IMMUNOCOMPROMISED (HCC): ICD-10-CM

## 2020-06-22 DIAGNOSIS — Z20.822 CLOSE EXPOSURE TO COVID-19 VIRUS: Primary | ICD-10-CM

## 2020-06-22 NOTE — TELEPHONE ENCOUNTER
Pt states he is currently being treated for leukemia and his grand daughter is being tested today for Covid-19, grand daughter lives with pt. States grand daughter's pediatrician advised all family members be tested for Covid-19.  Pt denies any symptoms at

## 2020-06-23 ENCOUNTER — LAB ENCOUNTER (OUTPATIENT)
Dept: LAB | Facility: HOSPITAL | Age: 65
End: 2020-06-23
Attending: INTERNAL MEDICINE
Payer: MEDICARE

## 2020-06-23 DIAGNOSIS — D84.9 IMMUNOCOMPROMISED (HCC): ICD-10-CM

## 2020-06-23 DIAGNOSIS — Z20.822 CLOSE EXPOSURE TO COVID-19 VIRUS: ICD-10-CM

## 2020-06-24 ENCOUNTER — MED REC SCAN ONLY (OUTPATIENT)
Dept: INTERNAL MEDICINE CLINIC | Facility: CLINIC | Age: 65
End: 2020-06-24

## 2020-07-02 ENCOUNTER — OFFICE VISIT (OUTPATIENT)
Dept: OTOLARYNGOLOGY | Facility: CLINIC | Age: 65
End: 2020-07-02
Payer: MEDICARE

## 2020-07-02 VITALS
BODY MASS INDEX: 37.33 KG/M2 | HEART RATE: 107 BPM | HEIGHT: 69 IN | DIASTOLIC BLOOD PRESSURE: 83 MMHG | SYSTOLIC BLOOD PRESSURE: 121 MMHG | WEIGHT: 252 LBS

## 2020-07-02 DIAGNOSIS — H66.004 RECURRENT ACUTE SUPPURATIVE OTITIS MEDIA OF RIGHT EAR WITHOUT SPONTANEOUS RUPTURE OF TYMPANIC MEMBRANE: Primary | ICD-10-CM

## 2020-07-02 PROCEDURE — 99213 OFFICE O/P EST LOW 20 MIN: CPT | Performed by: OTOLARYNGOLOGY

## 2020-07-02 PROCEDURE — G0463 HOSPITAL OUTPT CLINIC VISIT: HCPCS | Performed by: OTOLARYNGOLOGY

## 2020-07-02 RX ORDER — OFLOXACIN 3 MG/ML
3 SOLUTION AURICULAR (OTIC) 3 TIMES DAILY
Qty: 1 BOTTLE | Refills: 0 | Status: SHIPPED | OUTPATIENT
Start: 2020-07-02 | End: 2021-05-11

## 2020-07-02 RX ORDER — AMOXICILLIN AND CLAVULANATE POTASSIUM 875; 125 MG/1; MG/1
1 TABLET, FILM COATED ORAL EVERY 12 HOURS
Qty: 20 TABLET | Refills: 0 | Status: SHIPPED | OUTPATIENT
Start: 2020-07-02 | End: 2021-02-26 | Stop reason: ALTCHOICE

## 2020-07-02 NOTE — PROGRESS NOTES
Rach Rodriguez is a 60year old male.   Patient presents with:  Ear Problem: chronic ear infection right ear, pt cleaned ear the other day and noticed blood tinged drainage on qtip      HISTORY OF PRESENT ILLNESS    He presents with a history of signifiant prostatic hyperplasia)    • Leukemia ([facility]    • Leukemia ([facility] 75.)    • Lipid screening 07/24/2009    Per [facility]   • Lymphoma ([facility].) 2000    Management:  medication   • Psoriasis    • Sinusitis    • Sleep apnea 11/20/16-Split    AHI 31 RDI 34 REM AHI 36 Supine Right: Bulging tympanic membrane with middle ear infection some drainage noted probable microperforation, Left: Normal.   Skin Normal Inspection - Normal.        Lymph Detail Normal Submental. Submandibular.  Anterior cervical. Posterior cervical. Supraclav Tab, 1 tablet p.o. q. at bedtime (Patient taking differently: 200 mg. 1 tablet p.o. q. at bedtime ), Disp: 30 tablet, Rfl: 2  •  tamsulosin HCl (FLOMAX) 0.4 MG Oral Cap, 0.4 mg daily.   , Disp: , Rfl:   •  finasteride (PROSCAR) 5 MG Oral Tab, Take 5 mg by m Augmentin and ofloxacin return to see me in 2 weeks. May need a tube at some point. Aliya Khan.  Criss Garrido MD    7/2/2020    4:17 PM

## 2020-07-24 ENCOUNTER — OFFICE VISIT (OUTPATIENT)
Dept: OTOLARYNGOLOGY | Facility: CLINIC | Age: 65
End: 2020-07-24
Payer: MEDICARE

## 2020-07-24 VITALS
BODY MASS INDEX: 37.33 KG/M2 | SYSTOLIC BLOOD PRESSURE: 129 MMHG | DIASTOLIC BLOOD PRESSURE: 74 MMHG | TEMPERATURE: 98 F | HEIGHT: 69 IN | WEIGHT: 252 LBS

## 2020-07-24 DIAGNOSIS — H66.004 RECURRENT ACUTE SUPPURATIVE OTITIS MEDIA OF RIGHT EAR WITHOUT SPONTANEOUS RUPTURE OF TYMPANIC MEMBRANE: Primary | ICD-10-CM

## 2020-07-24 PROCEDURE — G0463 HOSPITAL OUTPT CLINIC VISIT: HCPCS | Performed by: OTOLARYNGOLOGY

## 2020-07-24 PROCEDURE — 99213 OFFICE O/P EST LOW 20 MIN: CPT | Performed by: OTOLARYNGOLOGY

## 2020-07-24 RX ORDER — DIPHENHYDRAMINE HCL 12.5MG/5ML
LIQUID (ML) ORAL
Qty: 500 ML | Refills: 0 | Status: SHIPPED | OUTPATIENT
Start: 2020-07-24 | End: 2020-08-10

## 2020-07-24 NOTE — PROGRESS NOTES
Miki Travis is a 60year old male. Patient presents with:   Follow - Up: 3 week follow up- recurrent ear infections- per pt there is changes/improvement of symptoms      HISTORY OF PRESENT ILLNESS  He presents with a history of signifiant throat pain s Activity      Alcohol use: Yes        Alcohol/week: 0.0 standard drinks        Comment: (Beer) 6 pk of beer, rarely. Drug use: No    Other Topics      Concerns:        Caffeine Concern: Yes          Coffee, 1 cup daily.         Reaction to local anesth Palpation - Normal. Parotid gland - Normal. Thyroid gland - Normal.   Eyes Normal Conjunctiva - Right: Normal, Left: Normal. Pupil - Right: Normal, Left: Normal. Fundus - Right: Normal, Left: Normal.   Neurological Normal Memory - Normal. Cranial nerves - Rfl:   •  MONTELUKAST SODIUM 10 MG Oral Tab, TAKE 1 TABLET BY MOUTH EVERYDAY AT BEDTIME, Disp: 90 tablet, Rfl: 1  •  Clobetasol Propionate 0.05 % External Cream, APPLY TO AFFECTED AREA TWICE A DAY AS NEEDED, Disp: , Rfl: 2  •  Metoprolol Succinate ER 25 MG 875-125 MG Oral Tab, Take 1 tablet by mouth every 12 (twelve) hours. (Patient not taking: Reported on 7/24/2020 ), Disp: 20 tablet, Rfl: 0  •  Amoxicillin-Pot Clavulanate 875-125 MG Oral Tab, Take 1 tablet by mouth every 12 (twelve) hours.  (Patient not marisabel

## 2020-07-27 ENCOUNTER — TELEPHONE (OUTPATIENT)
Dept: OTOLARYNGOLOGY | Facility: CLINIC | Age: 65
End: 2020-07-27

## 2020-07-27 NOTE — TELEPHONE ENCOUNTER
Per pt was never given hydrocortisone tablets only benadryl at pharmacy, pt requesting call back. Please call thank you.

## 2020-07-27 NOTE — TELEPHONE ENCOUNTER
[facility]- per pharmacy , they will compound medication for pt, pt to bring back benadryl elixir to pharmacy.  spoke with pharmacist Silas

## 2020-08-10 RX ORDER — IBUPROFEN/PSEUDOEPHEDRINE HCL 200MG-30MG
TABLET ORAL
Qty: 500 ML | Refills: 0 | Status: SHIPPED | OUTPATIENT
Start: 2020-08-10 | End: 2021-02-26

## 2020-08-13 RX ORDER — IBUPROFEN/PSEUDOEPHEDRINE HCL 200MG-30MG
TABLET ORAL
Qty: 500 ML | Refills: 0 | OUTPATIENT
Start: 2020-08-13

## 2020-08-17 ENCOUNTER — OFFICE VISIT (OUTPATIENT)
Dept: OTOLARYNGOLOGY | Facility: CLINIC | Age: 65
End: 2020-08-17
Payer: MEDICARE

## 2020-08-17 VITALS
HEIGHT: 69 IN | DIASTOLIC BLOOD PRESSURE: 76 MMHG | BODY MASS INDEX: 37.33 KG/M2 | HEART RATE: 83 BPM | WEIGHT: 252 LBS | SYSTOLIC BLOOD PRESSURE: 136 MMHG | TEMPERATURE: 98 F

## 2020-08-17 DIAGNOSIS — H66.004 RECURRENT ACUTE SUPPURATIVE OTITIS MEDIA OF RIGHT EAR WITHOUT SPONTANEOUS RUPTURE OF TYMPANIC MEMBRANE: Primary | ICD-10-CM

## 2020-08-17 PROCEDURE — 99213 OFFICE O/P EST LOW 20 MIN: CPT | Performed by: OTOLARYNGOLOGY

## 2020-08-17 PROCEDURE — G0463 HOSPITAL OUTPT CLINIC VISIT: HCPCS | Performed by: OTOLARYNGOLOGY

## 2020-08-17 RX ORDER — AMOXICILLIN AND CLAVULANATE POTASSIUM 875; 125 MG/1; MG/1
1 TABLET, FILM COATED ORAL EVERY 12 HOURS
Qty: 20 TABLET | Refills: 0 | Status: SHIPPED | OUTPATIENT
Start: 2020-08-17 | End: 2021-02-26 | Stop reason: ALTCHOICE

## 2020-08-18 NOTE — PROGRESS NOTES
Miles Jones is a 60year old male. Patient presents with:  Ear Problem: c/o poor hearing since putting head in bathtub water, approx 3-4wks ago    HPI:   He got water in his ear after going into his bathtub about 3 weeks ago.   His right ear immediately HCl (FLOMAX) 0.4 MG Oral Cap 0.4 mg daily. • finasteride (PROSCAR) 5 MG Oral Tab Take 5 mg by mouth daily. • Amoxicillin-Pot Clavulanate 875-125 MG Oral Tab Take 1 tablet by mouth every 12 (twelve) hours.  (Patient not taking: Reported on 8/17/2 Smoker        Packs/day: 1.00        Years: 33.00        Pack years: 35        Types: Cigarettes        Start date: 1970        Quit date: 2015        Years since quittin.2      Smokeless tobacco: Never Used      Tobacco comment: quit on and o right ear without spontaneous rupture of tympanic membrane  Right-sided otitis media prescribed. Prescription given for oral antibiotic.   Recommend reevaluation in a few weeks      The patient indicates understanding of these issues and agrees to the plan

## 2020-08-28 ENCOUNTER — TELEPHONE (OUTPATIENT)
Dept: OTOLARYNGOLOGY | Facility: CLINIC | Age: 65
End: 2020-08-28

## 2020-08-28 NOTE — TELEPHONE ENCOUNTER
Per pt his ear is feeling better, but not 100 %, pt states when he\" clears ears\" he can hear a whistle, per pt asking if he can have another round of antibiotic and ear drops?  Please advise

## 2020-08-28 NOTE — TELEPHONE ENCOUNTER
Last 2 day of amoxicillin and his ears feel better.  He is asking for another week of amoxicillin and the ear drops ofloxavin that Nicky Perez has given him in the past

## 2020-08-29 RX ORDER — OFLOXACIN 3 MG/ML
SOLUTION AURICULAR (OTIC)
Qty: 1 BOTTLE | Refills: 0 | Status: SHIPPED | OUTPATIENT
Start: 2020-08-29 | End: 2021-02-26

## 2020-08-29 RX ORDER — AMOXICILLIN AND CLAVULANATE POTASSIUM 875; 125 MG/1; MG/1
1 TABLET, FILM COATED ORAL EVERY 12 HOURS
Qty: 20 TABLET | Refills: 0 | Status: SHIPPED | OUTPATIENT
Start: 2020-08-29 | End: 2021-02-26

## 2020-09-22 ENCOUNTER — MED REC SCAN ONLY (OUTPATIENT)
Dept: INTERNAL MEDICINE CLINIC | Facility: CLINIC | Age: 65
End: 2020-09-22

## 2020-10-27 ENCOUNTER — OFFICE VISIT (OUTPATIENT)
Dept: OTOLARYNGOLOGY | Facility: CLINIC | Age: 65
End: 2020-10-27
Payer: MEDICARE

## 2020-10-27 VITALS
HEIGHT: 69 IN | BODY MASS INDEX: 37.33 KG/M2 | WEIGHT: 252 LBS | DIASTOLIC BLOOD PRESSURE: 66 MMHG | TEMPERATURE: 98 F | SYSTOLIC BLOOD PRESSURE: 122 MMHG

## 2020-10-27 DIAGNOSIS — H66.004 RECURRENT ACUTE SUPPURATIVE OTITIS MEDIA OF RIGHT EAR WITHOUT SPONTANEOUS RUPTURE OF TYMPANIC MEMBRANE: Primary | ICD-10-CM

## 2020-10-27 PROCEDURE — 99213 OFFICE O/P EST LOW 20 MIN: CPT | Performed by: OTOLARYNGOLOGY

## 2020-10-27 PROCEDURE — G0463 HOSPITAL OUTPT CLINIC VISIT: HCPCS | Performed by: OTOLARYNGOLOGY

## 2020-10-27 NOTE — PROGRESS NOTES
Miki Travis is a 63year old male. Patient presents with: Follow - Up: recurrent right ear infections- c/o light yellowisg drainage in right ear for 2 days    HPI:   He continues to experience problems with drainage from his right ear.   It was better Cream APPLY TO AFFECTED AREA TWICE DAILY 45 g 3   • cetirizine 10 MG Oral Tab Take 1 tablet (10 mg total) by mouth daily.  30 tablet 3   • Fluticasone Propionate 50 MCG/ACT Nasal Suspension SPRAY TWICE IN EACH NOSTRIL EVERY DAY     • ibrutinib (IMBRUVICA) 1 Per [facility]   • Lymph[facility] ([facility] 2000    Management:  medication   • Psoriasis    • Sinusitis    • Sleep apnea 11/20/16-Split    AHI 31 RDI 34 REM AHI 36 Supine AHI 98 non-supine AHI 17 Sao2 Nithin 81%/CPAP-8cwp/[facility]      Social History:  Social History    T Normal, Left: Normal.    Ears Normal Inspection - Right: Normal, Left: Normal. Canal - Left: Normal. TM - Right: Normal, Left: Normal.  Thickening of the tympanic membrane with debris suctioned from the ear canal and tympanic membrane      ASSESSMENT AND P

## 2020-10-30 ENCOUNTER — TELEPHONE (OUTPATIENT)
Dept: OTOLARYNGOLOGY | Facility: CLINIC | Age: 65
End: 2020-10-30

## 2020-10-30 RX ORDER — CLINDAMYCIN HYDROCHLORIDE 300 MG/1
300 CAPSULE ORAL EVERY 8 HOURS
Qty: 30 CAPSULE | Refills: 0 | Status: SHIPPED | OUTPATIENT
Start: 2020-10-30 | End: 2020-11-09

## 2020-11-10 ENCOUNTER — OFFICE VISIT (OUTPATIENT)
Dept: OTOLARYNGOLOGY | Facility: CLINIC | Age: 65
End: 2020-11-10
Payer: MEDICARE

## 2020-11-10 VITALS
BODY MASS INDEX: 37.33 KG/M2 | TEMPERATURE: 98 F | SYSTOLIC BLOOD PRESSURE: 120 MMHG | WEIGHT: 252 LBS | DIASTOLIC BLOOD PRESSURE: 77 MMHG | HEIGHT: 69 IN

## 2020-11-10 DIAGNOSIS — H66.004 RECURRENT ACUTE SUPPURATIVE OTITIS MEDIA OF RIGHT EAR WITHOUT SPONTANEOUS RUPTURE OF TYMPANIC MEMBRANE: Primary | ICD-10-CM

## 2020-11-10 PROCEDURE — G0463 HOSPITAL OUTPT CLINIC VISIT: HCPCS | Performed by: OTOLARYNGOLOGY

## 2020-11-10 PROCEDURE — 99213 OFFICE O/P EST LOW 20 MIN: CPT | Performed by: OTOLARYNGOLOGY

## 2020-11-10 RX ORDER — GENTAMICIN SULFATE 3 MG/ML
SOLUTION/ DROPS OPHTHALMIC
Qty: 1 BOTTLE | Refills: 0 | Status: SHIPPED | OUTPATIENT
Start: 2020-11-10 | End: 2021-05-11

## 2020-11-10 RX ORDER — IBRUTINIB 420 MG/1
TABLET, FILM COATED ORAL
COMMUNITY
Start: 2020-11-09

## 2020-11-10 RX ORDER — CLINDAMYCIN HYDROCHLORIDE 300 MG/1
300 CAPSULE ORAL EVERY 8 HOURS
Qty: 21 CAPSULE | Refills: 0 | Status: SHIPPED | OUTPATIENT
Start: 2020-11-10 | End: 2020-11-17

## 2020-11-11 NOTE — PROGRESS NOTES
Kayden Wade is a 63year old male. Patient presents with: Follow - Up: 2 week follow up- recurrent ear infections- per pt no changes/improvement of symptoms    HPI:   The culture from his right ear grew MRSA.   He is finished a course of clindamycin an Loratadine-Pseudoephedrine ER (CLARITIN-D 24 HOUR)  MG Oral Tablet 24 Hr Take 1 tablet by mouth daily. 90 tablet 1   • TADALAFIL OR Take 4.5 mg by mouth daily.      • clotrimazole-betamethasone 1-0.05 % External Cream APPLY TO AFFECTED AREA TWICE ENDY not taking: Reported on 11/10/2020 ) 20 tablet 0      Past Medical History:   Diagnosis Date   • Arthritis    • Bipolar affective ([facility] 75.)    • BPH (benign prostatic hyperplasia)    • Leukemia ([facility] 75.)    • Leukemia ([facility].)    • Lipid screening 07/24/2009    Per N - Oriented to time, place, person & situation. Appropriate mood and affect. Lymph Detail Normal Submental. Submandibular. Anterior cervical. Posterior cervical. Supraclavicular.    Eyes Normal Conjunctiva - Right: Normal, Left: Normal. Pupil - Right: Norm

## 2020-11-17 ENCOUNTER — OFFICE VISIT (OUTPATIENT)
Dept: OTOLARYNGOLOGY | Facility: CLINIC | Age: 65
End: 2020-11-17
Payer: MEDICARE

## 2020-11-17 VITALS
WEIGHT: 252 LBS | SYSTOLIC BLOOD PRESSURE: 127 MMHG | HEART RATE: 91 BPM | HEIGHT: 69 IN | DIASTOLIC BLOOD PRESSURE: 80 MMHG | TEMPERATURE: 97 F | BODY MASS INDEX: 37.33 KG/M2

## 2020-11-17 DIAGNOSIS — H66.3X1 CHRONIC SUPPURATIVE OTITIS MEDIA OF RIGHT EAR, UNSPECIFIED OTITIS MEDIA LOCATION: Primary | ICD-10-CM

## 2020-11-17 PROCEDURE — G0463 HOSPITAL OUTPT CLINIC VISIT: HCPCS | Performed by: OTOLARYNGOLOGY

## 2020-11-17 PROCEDURE — 99213 OFFICE O/P EST LOW 20 MIN: CPT | Performed by: OTOLARYNGOLOGY

## 2020-11-17 RX ORDER — TETRACYCLINE HYDROCHLORIDE 500 MG/1
500 CAPSULE ORAL 3 TIMES DAILY
Qty: 63 CAPSULE | Refills: 0 | Status: SHIPPED | OUTPATIENT
Start: 2020-11-17 | End: 2020-12-08

## 2020-11-17 NOTE — PROGRESS NOTES
Pamella Yarbrough is a 63year old male. Patient presents with:  Ear Problem: no improvement in Right ear since last week, c/o liquid in ear and poor hearing    HPI:   He continues to experience a lot of pressure and congestion in his right ear.   He is not h BESYLATE 2.5 MG Oral Tab TAKE 1 TABLET BY MOUTH EVERY DAY 90 tablet 1   • lamoTRIgine 200 MG Oral Tab TAKE 1 TABLET BY MOUTH EVERYDAY AT BEDTIME  3   • VALACYCLOVIR HCL 1 G Oral Tab TAKE 1 TABLET BY MOUTH EVERY DAY 30 tablet 5   • Loratadine-Pseudoephedrin hours. (Patient not taking: Reported on 11/17/2020 ) 20 tablet 0      Past Medical History:   Diagnosis Date   • Arthritis    • Bipolar affective ([facility]    • BPH (benign prostatic hyperplasia)    • [facility]    • [facility]    • Lipid screening 07/ Psychiatric Normal Orientation - Oriented to time, place, person & situation. Appropriate mood and affect. Lymph Detail Normal Submental. Submandibular. Anterior cervical. Posterior cervical. Supraclavicular.    Eyes Normal Conjunctiva - Right: Normal,

## 2020-11-23 RX ORDER — CHLORTHALIDONE 25 MG/1
TABLET ORAL
Qty: 90 TABLET | Refills: 1 | Status: SHIPPED | OUTPATIENT
Start: 2020-11-23 | End: 2021-02-26

## 2020-12-01 ENCOUNTER — APPOINTMENT (OUTPATIENT)
Dept: LAB | Age: 65
End: 2020-12-01
Attending: PHYSICIAN ASSISTANT
Payer: MEDICARE

## 2020-12-01 ENCOUNTER — VIRTUAL PHONE E/M (OUTPATIENT)
Dept: INTERNAL MEDICINE CLINIC | Facility: CLINIC | Age: 65
End: 2020-12-01
Payer: MEDICARE

## 2020-12-01 DIAGNOSIS — C91.10 CLL (CHRONIC LYMPHOCYTIC LEUKEMIA) (HCC): ICD-10-CM

## 2020-12-01 DIAGNOSIS — Z20.822 EXPOSURE TO COVID-19 VIRUS: ICD-10-CM

## 2020-12-01 DIAGNOSIS — Z20.822 EXPOSURE TO COVID-19 VIRUS: Primary | ICD-10-CM

## 2020-12-01 PROCEDURE — 99441 PHONE E/M BY PHYS 5-10 MIN: CPT | Performed by: PHYSICIAN ASSISTANT

## 2020-12-01 NOTE — PROGRESS NOTES
Telephone Check-In    Huma Dang verbally consents to a Virtual/Telephone Check-In service on 12/01/20. Patient understands and accepts financial responsibility for any deductible, co-insurance and/or co-pays associated with this service.     Duration

## 2020-12-07 ENCOUNTER — TELEPHONE (OUTPATIENT)
Dept: INTERNAL MEDICINE CLINIC | Facility: CLINIC | Age: 65
End: 2020-12-07

## 2020-12-07 NOTE — TELEPHONE ENCOUNTER
Pt wondering when it is safe to be around his family members who have tested positive for COVID-19 since he tested negative but has Lukemia.  He states he has a lab appointment next week which will be 14 days from his exposure to his wife and asking if he c

## 2020-12-10 ENCOUNTER — PATIENT MESSAGE (OUTPATIENT)
Dept: INTERNAL MEDICINE CLINIC | Facility: CLINIC | Age: 65
End: 2020-12-10

## 2020-12-10 ENCOUNTER — NURSE TRIAGE (OUTPATIENT)
Dept: INTERNAL MEDICINE CLINIC | Facility: CLINIC | Age: 65
End: 2020-12-10

## 2020-12-10 NOTE — TELEPHONE ENCOUNTER
Action Requested: Summary for Provider     []  Critical Lab, Recommendations Needed  [] Need Additional Advice  []   FYI    []   Need Orders  [] Need Medications Sent to Pharmacy  []  Other     SUMMARY: appt scheduled 2/14/18 with FABIEN WATTS; pt states Unknown if ever smoked

## 2020-12-10 NOTE — TELEPHONE ENCOUNTER
Patient was tested on 12/08, it came back positive on 12/10. Patient has a history of cancer and chronic health issues. He is very concerned as to what he should be doing about this.  At this time he does have a runny nose and dry cough but is unsure if it

## 2020-12-10 NOTE — TELEPHONE ENCOUNTER
Action Requested: Summary for Provider     []  Critical Lab, Recommendations Needed  [] Need Additional Advice  [x]   FYI    []   Need Orders  [] Need Medications Sent to Pharmacy  []  Other     SUMMARY: Patient reports tested on 12/8/20 for Covid and was

## 2020-12-11 NOTE — TELEPHONE ENCOUNTER
Patient contacted. Advised on monitoring temperatures, pulse ox on a regular basis. Advised to avoid Advil, Aleve, ibuprofen like medications. He may continue on his baby aspirin on a daily basis.   Advised to take some Tylenol 500 mg 2 tablets 2-3 times

## 2020-12-11 NOTE — TELEPHONE ENCOUNTER
What  was your temp today? States that his wife is buying a new thermometer  , states that  He thinks the old thermometer is  not working anymore. =advised to check temperature twice a day      How did you take your temp?      N/a    Are you feeling sh

## 2020-12-11 NOTE — TELEPHONE ENCOUNTER
See acute telephone encounter 12/10/20.    From: Piter Guillen  To: Matt Bridges MD  Sent: 12/10/2020  6:00 PM CST  Subject: Test Results Question    Positive Covid Test Results

## 2020-12-14 NOTE — TELEPHONE ENCOUNTER
What  was your temp today? - 97.3    How did you take your temp?     with an oral thermometer    Are you feeling short of breath today? No      Is the shortness of breath better, the same, or worse than yesterday?   n/a    Are you having a cough today?

## 2020-12-17 NOTE — TELEPHONE ENCOUNTER
What  was your temp today? Not checked yet; last night= 97.1 F    How did you take your temp?     with an oral thermometer    Are you feeling short of breath today? No      Is the shortness of breath better, the same, or worse than yesterday?    N/A    Ar

## 2020-12-23 ENCOUNTER — MED REC SCAN ONLY (OUTPATIENT)
Dept: INTERNAL MEDICINE CLINIC | Facility: CLINIC | Age: 65
End: 2020-12-23

## 2021-02-15 ENCOUNTER — TELEPHONE (OUTPATIENT)
Dept: INTERNAL MEDICINE CLINIC | Facility: CLINIC | Age: 66
End: 2021-02-15

## 2021-02-15 DIAGNOSIS — U07.1 LOWER RESPIRATORY TRACT INFECTION DUE TO COVID-19 VIRUS: Primary | ICD-10-CM

## 2021-02-15 DIAGNOSIS — J22 LOWER RESPIRATORY TRACT INFECTION DUE TO COVID-19 VIRUS: Primary | ICD-10-CM

## 2021-02-15 NOTE — TELEPHONE ENCOUNTER
Zafar Barroso from central scheduling calling in orders for patient. Patient states that Dr. Marta Parra was to put in orders for him to complete an EKG and a chest x-ray. Please advise.

## 2021-02-22 ENCOUNTER — LAB ENCOUNTER (OUTPATIENT)
Dept: LAB | Facility: HOSPITAL | Age: 66
End: 2021-02-22
Attending: INTERNAL MEDICINE
Payer: MEDICARE

## 2021-02-22 ENCOUNTER — HOSPITAL ENCOUNTER (OUTPATIENT)
Dept: GENERAL RADIOLOGY | Facility: HOSPITAL | Age: 66
Discharge: HOME | End: 2021-02-22
Attending: INTERNAL MEDICINE
Payer: MEDICARE

## 2021-02-22 DIAGNOSIS — J22 LOWER RESPIRATORY TRACT INFECTION DUE TO COVID-19 VIRUS: ICD-10-CM

## 2021-02-22 DIAGNOSIS — U07.1 LOWER RESPIRATORY TRACT INFECTION DUE TO COVID-19 VIRUS: ICD-10-CM

## 2021-02-22 DIAGNOSIS — J22 LOWER RESPIRATORY TRACT INFECTION: ICD-10-CM

## 2021-02-22 DIAGNOSIS — U07.1 INFECTION DUE TO 2019-NCOV: Primary | ICD-10-CM

## 2021-02-22 LAB
ALBUMIN SERPL-MCNC: 4.1 G/DL (ref 3.4–5)
ALBUMIN/GLOB SERPL: 1.5 {RATIO} (ref 1–2)
ALP LIVER SERPL-CCNC: 70 U/L
ALT SERPL-CCNC: 30 U/L
ANION GAP SERPL CALC-SCNC: 3 MMOL/L (ref 0–18)
AST SERPL-CCNC: 22 U/L (ref 15–37)
BASOPHILS # BLD AUTO: 0.03 X10(3) UL (ref 0–0.2)
BASOPHILS NFR BLD AUTO: 0.5 %
BILIRUB SERPL-MCNC: 1.2 MG/DL (ref 0.1–2)
BUN BLD-MCNC: 12 MG/DL (ref 7–18)
BUN/CREAT SERPL: 14.3 (ref 10–20)
CALCIUM BLD-MCNC: 9.1 MG/DL (ref 8.5–10.1)
CHLORIDE SERPL-SCNC: 107 MMOL/L (ref 98–112)
CO2 SERPL-SCNC: 30 MMOL/L (ref 21–32)
CREAT BLD-MCNC: 0.84 MG/DL
DEPRECATED RDW RBC AUTO: 46.8 FL (ref 35.1–46.3)
EOSINOPHIL # BLD AUTO: 0.05 X10(3) UL (ref 0–0.7)
EOSINOPHIL NFR BLD AUTO: 0.8 %
ERYTHROCYTE [DISTWIDTH] IN BLOOD BY AUTOMATED COUNT: 14.3 % (ref 11–15)
GLOBULIN PLAS-MCNC: 2.8 G/DL (ref 2.8–4.4)
GLUCOSE BLD-MCNC: 117 MG/DL (ref 70–99)
HCT VFR BLD AUTO: 44.5 %
HGB BLD-MCNC: 14.8 G/DL
IMM GRANULOCYTES # BLD AUTO: 0.03 X10(3) UL (ref 0–1)
IMM GRANULOCYTES NFR BLD: 0.5 %
LYMPHOCYTES # BLD AUTO: 2.41 X10(3) UL (ref 1–4)
LYMPHOCYTES NFR BLD AUTO: 37.7 %
M PROTEIN MFR SERPL ELPH: 6.9 G/DL (ref 6.4–8.2)
MCH RBC QN AUTO: 30 PG (ref 26–34)
MCHC RBC AUTO-ENTMCNC: 33.3 G/DL (ref 31–37)
MCV RBC AUTO: 90.1 FL
MONOCYTES # BLD AUTO: 0.53 X10(3) UL (ref 0.1–1)
MONOCYTES NFR BLD AUTO: 8.3 %
NEUTROPHILS # BLD AUTO: 3.34 X10 (3) UL (ref 1.5–7.7)
NEUTROPHILS # BLD AUTO: 3.34 X10(3) UL (ref 1.5–7.7)
NEUTROPHILS NFR BLD AUTO: 52.2 %
OSMOLALITY SERPL CALC.SUM OF ELEC: 291 MOSM/KG (ref 275–295)
PATIENT FASTING Y/N/NP: YES
PLATELET # BLD AUTO: 137 10(3)UL (ref 150–450)
POTASSIUM SERPL-SCNC: 4.6 MMOL/L (ref 3.5–5.1)
RBC # BLD AUTO: 4.94 X10(6)UL
SODIUM SERPL-SCNC: 140 MMOL/L (ref 136–145)
WBC # BLD AUTO: 6.4 X10(3) UL (ref 4–11)

## 2021-02-22 PROCEDURE — 85025 COMPLETE CBC W/AUTO DIFF WBC: CPT

## 2021-02-22 PROCEDURE — 93010 ELECTROCARDIOGRAM REPORT: CPT | Performed by: INTERNAL MEDICINE

## 2021-02-22 PROCEDURE — 36415 COLL VENOUS BLD VENIPUNCTURE: CPT

## 2021-02-22 PROCEDURE — 80053 COMPREHEN METABOLIC PANEL: CPT

## 2021-02-22 PROCEDURE — 71046 X-RAY EXAM CHEST 2 VIEWS: CPT | Performed by: INTERNAL MEDICINE

## 2021-02-22 PROCEDURE — 93005 ELECTROCARDIOGRAM TRACING: CPT

## 2021-02-26 ENCOUNTER — OFFICE VISIT (OUTPATIENT)
Dept: INTERNAL MEDICINE CLINIC | Facility: CLINIC | Age: 66
End: 2021-02-26
Payer: MEDICARE

## 2021-02-26 VITALS
DIASTOLIC BLOOD PRESSURE: 72 MMHG | SYSTOLIC BLOOD PRESSURE: 126 MMHG | TEMPERATURE: 97 F | HEART RATE: 76 BPM | BODY MASS INDEX: 38.36 KG/M2 | WEIGHT: 259 LBS | HEIGHT: 69 IN | RESPIRATION RATE: 20 BRPM

## 2021-02-26 DIAGNOSIS — C91.10 CLL (CHRONIC LYMPHOCYTIC LEUKEMIA) (HCC): ICD-10-CM

## 2021-02-26 DIAGNOSIS — I48.20 CHRONIC ATRIAL FIBRILLATION (HCC): ICD-10-CM

## 2021-02-26 DIAGNOSIS — Z86.16 HISTORY OF 2019 NOVEL CORONAVIRUS DISEASE (COVID-19): ICD-10-CM

## 2021-02-26 DIAGNOSIS — I10 ESSENTIAL HYPERTENSION: Primary | ICD-10-CM

## 2021-02-26 DIAGNOSIS — F31.62 BIPOLAR DISORDER, CURRENT EPISODE MIXED, MODERATE (HCC): ICD-10-CM

## 2021-02-26 PROCEDURE — 99214 OFFICE O/P EST MOD 30 MIN: CPT | Performed by: INTERNAL MEDICINE

## 2021-02-26 RX ORDER — SULFAMETHOXAZOLE AND TRIMETHOPRIM 800; 160 MG/1; MG/1
TABLET ORAL
Qty: 10 TABLET | Refills: 11 | COMMUNITY
Start: 2021-02-26 | End: 2021-05-11

## 2021-02-26 RX ORDER — METOPROLOL SUCCINATE 200 MG/1
200 TABLET, EXTENDED RELEASE ORAL DAILY
Qty: 90 TABLET | Refills: 1 | COMMUNITY
Start: 2021-02-26

## 2021-02-26 NOTE — PROGRESS NOTES
HPI:    Patient ID: Sam Mcfarlane is a 63year old male.     ASSESSMENT/PLAN:  Impression:  In summary, this is a pleasant 63 y.o. male with obestiy (BMI 39), HTN, JADYN on CPAP, h/o CLL, dx 2009 -> Jan 2014 - Lymphoma -> Jan 2016 NHL s/p multiple lines o Treatments tried: He was treated with Rituxan every 6 months for this disorder and because he was thought to have psoriatic arthritis or rheumatoid arthritis, and it was thought that this would help both conditions. The treatment provided moderate relief. 20   Temp: 97.4 °F (36.3 °C)     Body mass index is 38.25 kg/m². PHYSICAL EXAM:   Physical Exam  Vitals and nursing note reviewed. Constitutional:       Appearance: He is well-developed. HENT:      Head: Normocephalic and atraumatic.       Right Ear: anemia, repeat bone marrow exam at the time showed replacement with lymphoma. Treatments were changed to bendamustine and Rituxan on January 2015 and he completed about 6 cycles by July 2015.   By January 2016 he developed bone marrow replacement with non- to monitor at this time  Heart rate has been stable on metoprolol 200 mg daily. Blood pressures look better controlled at this time.                History of 2019 novel coronavirus disease (COVID-19)     Diagnosed with COVID-19 infection on December 10 an

## 2021-02-26 NOTE — ASSESSMENT & PLAN NOTE
Patient with a history of chronic atrial fibrillation. He has been on Imbruvica since May 2016. Discussed with Dr. Michi Locke from [place], he has been on aspirin for management of his stroke risks. [id number].   Increased risk of bleedin

## 2021-02-26 NOTE — ASSESSMENT & PLAN NOTE
Blood pressure 126/72, pulse 76, temperature 97.4 °F (36.3 °C), temperature source Other, resp. rate 20, height 5' 9\" (1.753 m), weight 259 lb (117.5 kg). Blood pressure looks stable at this time. He has been on metoprolol 200 mg daily.   His heart ra

## 2021-02-26 NOTE — PATIENT INSTRUCTIONS
Problem List Items Addressed This Visit        Unprioritized    Bipolar disorder, current episode mixed, moderate ([address]     Per patient accounts he has been off of Lamictal.  His moods have been stable and he has not had any significant agitation.   He is b 9\" (1.753 m), weight 259 lb (117.5 kg). Blood pressure looks stable at this time. He has been on metoprolol 200 mg daily. His heart rates look stable. He does not have any signs of CHF. Continues to be in A. fib at this time.            Relevant Me

## 2021-02-26 NOTE — ASSESSMENT & PLAN NOTE
Patient has been treated through [facility] per Dr. Marcelle Matias. Initial diagnosis in 2009 and was on observation until 2012 at which time he was started on Rituxan every 6 months.   He developed worsening anemia, repeat bone marrow exam at

## 2021-02-26 NOTE — ASSESSMENT & PLAN NOTE
Diagnosed with COVID-19 infection on December 10 and treated with bamlanivimab infusion with improvement in his symptoms. Continues to be fatigued. No fevers or chills. EKG, labs and chest x-ray reviewed.

## 2021-02-26 NOTE — ASSESSMENT & PLAN NOTE
Per patient accounts he has been off of Lamictal.  His moods have been stable and he has not had any significant agitation. He is being monitored per psychiatry.

## 2021-03-02 ENCOUNTER — OFFICE VISIT (OUTPATIENT)
Dept: PODIATRY CLINIC | Facility: CLINIC | Age: 66
End: 2021-03-02
Payer: MEDICARE

## 2021-03-02 DIAGNOSIS — L60.3 NAIL DYSTROPHY: Primary | ICD-10-CM

## 2021-03-02 PROCEDURE — 99202 OFFICE O/P NEW SF 15 MIN: CPT | Performed by: PODIATRIST

## 2021-03-02 RX ORDER — ACYCLOVIR 400 MG/1
400 TABLET ORAL DAILY
COMMUNITY
Start: 2021-01-16

## 2021-03-02 NOTE — PROGRESS NOTES
HPI:    Patient ID: Shalini Boateng is a 63year old male. 31-year-old male presents to me as a new patient and is referred by his wife.   The reason for this visit is the fact that the right great toenail is loosening and falling off and he was concerned moderate edema in both feet and lower legs. He is taking medications due to psoriatic arthritis. He has a chronic rash and sees dermatology on a regular basis. Is my impression that this right great toenail is more than likely psoriatic.   It is loose an

## 2021-03-30 ENCOUNTER — OFFICE VISIT (OUTPATIENT)
Dept: OTOLARYNGOLOGY | Facility: CLINIC | Age: 66
End: 2021-03-30
Payer: MEDICARE

## 2021-03-30 VITALS
HEIGHT: 69 IN | TEMPERATURE: 97 F | DIASTOLIC BLOOD PRESSURE: 82 MMHG | BODY MASS INDEX: 38.36 KG/M2 | SYSTOLIC BLOOD PRESSURE: 126 MMHG | WEIGHT: 259 LBS

## 2021-03-30 DIAGNOSIS — H66.3X1 CHRONIC SUPPURATIVE OTITIS MEDIA OF RIGHT EAR, UNSPECIFIED OTITIS MEDIA LOCATION: Primary | ICD-10-CM

## 2021-03-30 PROCEDURE — 99213 OFFICE O/P EST LOW 20 MIN: CPT | Performed by: OTOLARYNGOLOGY

## 2021-03-30 RX ORDER — NEOMYCIN SULFATE, POLYMYXIN B SULFATE AND HYDROCORTISONE 10; 3.5; 1 MG/ML; MG/ML; [USP'U]/ML
3 SUSPENSION/ DROPS AURICULAR (OTIC) 3 TIMES DAILY
Qty: 10 ML | Refills: 1 | Status: SHIPPED | OUTPATIENT
Start: 2021-03-30 | End: 2021-04-09

## 2021-03-30 RX ORDER — AMOXICILLIN AND CLAVULANATE POTASSIUM 875; 125 MG/1; MG/1
1 TABLET, FILM COATED ORAL 2 TIMES DAILY
Qty: 42 TABLET | Refills: 1 | Status: SHIPPED | OUTPATIENT
Start: 2021-03-30 | End: 2021-04-20

## 2021-03-30 RX ORDER — DEXAMETHASONE 6 MG/1
TABLET ORAL
Qty: 4 TABLET | Refills: 0 | Status: SHIPPED | OUTPATIENT
Start: 2021-03-30

## 2021-03-30 NOTE — PROGRESS NOTES
Ernestine Acuna is a 63year old male. Patient presents with:  Ear Problem: c/o right ear pain with yellowish discharges for  a while      HISTORY OF PRESENT ILLNESS  10/28/2017  Patient prevents for evaluation of sinusitis.   He feels terrible he has had flat when he lies on his right side he has ear drainage his hearing is diminished in the right ear a lot of pain.   Social History    Socioeconomic History      Marital status:       Spouse name: Not on file      Number of children: Not on file Onset   • Breast Cancer Mother    • Other (Other) Father         aneurysm   • Prostate Cancer Brother    • Cancer Brother         Lung Cancer - smoker     Past Medical History:   Diagnosis Date   • Arthritis    • Bipolar affective [address]    • BPH (benign pr grossly intact.  Gait is normal   Head/Face Normal Facial features - Normal. Eyebrows - Normal. Skull - Normal.             Ears abNormal Inspection - Right: Normal, Left: Normal. Canal - Right: Filled with pus left: Normal. TM - Right inflamed drum with ac (PROSCAR) 5 MG Oral Tab, Take 5 mg by mouth daily.   , Disp: , Rfl:   •  Sulfamethoxazole-TMP -160 MG Oral Tab per tablet, Take one tablet by mouth every Saturday and Sunday (Patient not taking: Reported on 3/30/2021 ), Disp: 10 tablet, Rfl: 11  •  Ge

## 2021-05-14 NOTE — TELEPHONE ENCOUNTER
The patient stated is not taking. He has everything on auto refill.   I called [facility] and the medication was discontinued from his record

## 2021-05-15 RX ORDER — CHLORTHALIDONE 25 MG/1
TABLET ORAL
Qty: 90 TABLET | Refills: 1 | Status: SHIPPED | OUTPATIENT
Start: 2021-05-15 | End: 2021-05-15

## 2021-08-03 ENCOUNTER — OFFICE VISIT (OUTPATIENT)
Dept: OTOLARYNGOLOGY | Facility: CLINIC | Age: 66
End: 2021-08-03
Payer: MEDICARE

## 2021-08-03 VITALS — TEMPERATURE: 98 F

## 2021-08-03 DIAGNOSIS — J01.00 ACUTE NON-RECURRENT MAXILLARY SINUSITIS: Primary | ICD-10-CM

## 2021-08-03 DIAGNOSIS — H66.004 RECURRENT ACUTE SUPPURATIVE OTITIS MEDIA OF RIGHT EAR WITHOUT SPONTANEOUS RUPTURE OF TYMPANIC MEMBRANE: ICD-10-CM

## 2021-08-03 PROCEDURE — 99213 OFFICE O/P EST LOW 20 MIN: CPT | Performed by: OTOLARYNGOLOGY

## 2021-08-03 RX ORDER — AMOXICILLIN AND CLAVULANATE POTASSIUM 875; 125 MG/1; MG/1
1 TABLET, FILM COATED ORAL 2 TIMES DAILY
Qty: 28 TABLET | Refills: 0 | Status: SHIPPED | OUTPATIENT
Start: 2021-08-03 | End: 2021-08-17

## 2021-08-03 RX ORDER — LISINOPRIL 5 MG/1
5 TABLET ORAL DAILY
COMMUNITY
Start: 2021-03-30

## 2021-08-03 RX ORDER — PREDNISONE 20 MG/1
TABLET ORAL
Qty: 7 TABLET | Refills: 0 | Status: SHIPPED | OUTPATIENT
Start: 2021-08-03 | End: 2021-08-17 | Stop reason: ALTCHOICE

## 2021-08-03 NOTE — PROGRESS NOTES
Maura Pacheco is a 63year old male. Patient presents with:  Sinus Problem: feels he's had sinus infection since Friday      HISTORY OF PRESENT ILLNESS  10/28/2017  Patient prevents for evaluation of sinusitis.   He feels terrible he has had for day hist he lies on his right side he has ear drainage his hearing is diminished in the right ear a lot of pain.   8/3/2021  Nasal drainage grand kids are sick ear pain and pressure  Social History    Socioeconomic History      Marital status:       Spouse na vision and vision changes. Respiratory Negative Dyspnea and wheezing. Cardio Negative Chest pain, irregular heartbeat/palpitations and syncope. GI Negative Abdominal pain and diarrhea. Endocrine Negative Cold intolerance and heat intolerance.    Luz % External Cream, APPLY TO AFFECTED AREA TWICE A DAY AS NEEDED, Disp: , Rfl: 2  •  tamsulosin HCl (FLOMAX) 0.4 MG Oral Cap, 0.4 mg daily. , Disp: , Rfl:   •  finasteride (PROSCAR) 5 MG Oral Tab, Take 5 mg by mouth daily.   , Disp: , Rfl:   •  dexamethasone

## 2021-08-17 ENCOUNTER — OFFICE VISIT (OUTPATIENT)
Dept: INTERNAL MEDICINE CLINIC | Facility: CLINIC | Age: 66
End: 2021-08-17
Payer: MEDICARE

## 2021-08-17 VITALS
HEIGHT: 69 IN | WEIGHT: 254 LBS | RESPIRATION RATE: 20 BRPM | SYSTOLIC BLOOD PRESSURE: 118 MMHG | TEMPERATURE: 97 F | DIASTOLIC BLOOD PRESSURE: 84 MMHG | BODY MASS INDEX: 37.62 KG/M2 | HEART RATE: 102 BPM

## 2021-08-17 DIAGNOSIS — G47.33 OBSTRUCTIVE SLEEP APNEA SYNDROME: ICD-10-CM

## 2021-08-17 DIAGNOSIS — Z12.5 PROSTATE CANCER SCREENING: ICD-10-CM

## 2021-08-17 DIAGNOSIS — C91.10 CLL (CHRONIC LYMPHOCYTIC LEUKEMIA) (HCC): ICD-10-CM

## 2021-08-17 DIAGNOSIS — Z00.00 ENCOUNTER FOR ANNUAL HEALTH EXAMINATION: ICD-10-CM

## 2021-08-17 DIAGNOSIS — N40.0 BENIGN PROSTATIC HYPERPLASIA, UNSPECIFIED WHETHER LOWER URINARY TRACT SYMPTOMS PRESENT: ICD-10-CM

## 2021-08-17 DIAGNOSIS — D84.9 IMMUNOCOMPROMISED (HCC): ICD-10-CM

## 2021-08-17 DIAGNOSIS — I48.20 CHRONIC ATRIAL FIBRILLATION (HCC): ICD-10-CM

## 2021-08-17 DIAGNOSIS — I10 ESSENTIAL HYPERTENSION: ICD-10-CM

## 2021-08-17 DIAGNOSIS — J32.0 CHRONIC MAXILLARY SINUSITIS: ICD-10-CM

## 2021-08-17 DIAGNOSIS — F31.62 BIPOLAR DISORDER, CURRENT EPISODE MIXED, MODERATE (HCC): ICD-10-CM

## 2021-08-17 DIAGNOSIS — Z00.00 MEDICARE ANNUAL WELLNESS VISIT, SUBSEQUENT: Primary | ICD-10-CM

## 2021-08-17 DIAGNOSIS — D69.6 THROMBOCYTOPENIA (HCC): ICD-10-CM

## 2021-08-17 DIAGNOSIS — L40.50 PSORIATIC ARTHROPATHY (HCC): ICD-10-CM

## 2021-08-17 DIAGNOSIS — E66.01 SEVERE OBESITY (BMI 35.0-39.9) WITH COMORBIDITY (HCC): Chronic | ICD-10-CM

## 2021-08-17 DIAGNOSIS — Z12.11 COLON CANCER SCREENING: ICD-10-CM

## 2021-08-17 DIAGNOSIS — Z86.16 HISTORY OF 2019 NOVEL CORONAVIRUS DISEASE (COVID-19): ICD-10-CM

## 2021-08-17 PROCEDURE — G0447 BEHAVIOR COUNSEL OBESITY 15M: HCPCS | Performed by: INTERNAL MEDICINE

## 2021-08-17 PROCEDURE — G0439 PPPS, SUBSEQ VISIT: HCPCS | Performed by: INTERNAL MEDICINE

## 2021-08-17 RX ORDER — SULFAMETHOXAZOLE AND TRIMETHOPRIM 800; 160 MG/1; MG/1
TABLET ORAL
COMMUNITY
Start: 2021-08-06

## 2021-08-17 NOTE — ASSESSMENT & PLAN NOTE
Blood pressure 118/84, pulse 102, temperature 97 °F (36.1 °C), temperature source Other, resp. rate 20, height 5' 9\" (1.753 m), weight 254 lb (115.2 kg). Stable blood pressure, well controlled at this time on metoprolol 20 mg daily.   Heart rates have flu

## 2021-08-17 NOTE — ASSESSMENT & PLAN NOTE
JADYN, on CPAP, doing quite well with the machine. Has been exercising on a regular basis and has had modest weight drop. Continue to follow-up. He has benefited from use–blood pressures are much better.   Daytime fatigue and drowsiness have improved

## 2021-08-17 NOTE — ASSESSMENT & PLAN NOTE
Chronic atrial fibrillation, on Imbruvica since 2016. Monitored per Dr. Cristina Mi. He has been on aspirin for management of his stroke risks–[id number]. Increased risk of bleeding on Imbruvica especially as he has chronic thrombocytopenia in addition.

## 2021-08-17 NOTE — ASSESSMENT & PLAN NOTE
Normal exam.  Labs as ordered. Skin check–normal  No cervical, axillary, inguinal lymphadenopathy. Hernial orifices intact. Rectal exam normal,prostate normal to palpation. Small hemorrhoids present.  guaic negetive brown stools.   Colonoscopy completed English

## 2021-08-17 NOTE — ASSESSMENT & PLAN NOTE
Asymptomatic low platelet levels most likely secondary to his non-Hodgkin's treatment that is ongoing as well as history of Hodgkin's lymphoma. Continue to monitor. Follow-up with oncology as directed.

## 2021-08-17 NOTE — ASSESSMENT & PLAN NOTE
Body mass index is 37.51 kg/m².    Wt Readings from Last 6 Encounters:  08/17/21 : 254 lb (115.2 kg)  03/30/21 : 259 lb (117.5 kg)  02/26/21 : 259 lb (117.5 kg)  11/17/20 : 252 lb (114.3 kg)  11/10/20 : 252 lb (114.3 kg)  10/27/20 : 252 lb (114.3 kg)     Gr

## 2021-08-17 NOTE — PATIENT INSTRUCTIONS
Problem List Items Addressed This Visit        Unprioritized    Bipolar disorder, current episode mixed, moderate ([address]     Patient has been doing well. He has been off Lamictal.  No significant mood variations at this time.   He is monitored on a regular bendamustine and Rituxan on January 2015, completed about 6 cycles by July 2015. In January 2016 he developed bone marrow replacement with non-Hodgkin's lymphoma, medications were then changed to Rituxan and Revlimid.   He could not tolerate the Revlimid a lymphoma. Immunocompromised ([facility] [address]     Immunocompromise due to ongoing treatment for non-Hodgkin's lymphoma. He is on Ledger. He has tolerated medications well.   Recent immunoglobulin level estimates show reduction in all IgG, IgM and IgA l psoriatic arthropathy and inflammatory polyarthritis secondary to psoriasis. Currently symptoms are stable. He will follow-up as directed with rheumatology/dermatology. Referral for Dr. Darin Elena provided. Severe obesity (BMI 35.0-39. 9) with [employer] PREVENTATIVE SERVICES FREQUENCY &  COVERAGE DETAILS LAST COMPLETION DATE   Diabetes Screening    Fasting Blood Sugar / Glucose    One screening every 12 months if never tested or if previously tested but not diagnosed with pre-diabetes   One screening ev this time    Tetanus Toxoid Not covered by Medicare Part B unless medically necessary (cut with metal); may be covered with your pharmacy prescription benefits -    Tetanus, Diptheria and Pertusis TD and TDaP Not covered by Medicare Part B -  No recommenda

## 2021-08-17 NOTE — PROGRESS NOTES
HPI:   Brianne Snowden is a 63year old male who presents for a Medicare Subsequent Annual Wellness visit (Pt already had Initial Annual Wellness).   His last annual assessment has been over 1 year: Annual Physical due on 08/17/2022       Seen by cardiocaitlin overall left ventricular systolic function is mildly reduced. The calculated left ventricular ejection fraction is 46 %. There is global mildly reduced wall thickening. Transient left ventricular cavity dilation at stress is not present. SUMMARY:  1.  Myoc Assessment Update needed    Last Fall risk screen was either > 7 days ago and abnormal, or has not been done this year. Please refresh this link, or assess fall risk and then refresh this link.                Cognitive Assessment   He had a completely nor (HEMATOLOGY)  Monae Hand as Consulting Physician (RHEUMATOLOGY)    Patient Active Problem List:     Chronic maxillary sinusitis     CLL (chronic lymphocytic leukemia) ([facility] [address]     Bipolar disorder, current episode mixed, moderate (HCC)     Psoriatic arthro Clobetasol Propionate 0.05 % External Cream, APPLY TO AFFECTED AREA TWICE A DAY AS NEEDED  metroNIDAZOLE 0.75 % External Gel, Use bid to nose for rosacea  TADALAFIL OR, Take 4.5 mg by mouth daily.   clotrimazole-betamethasone 1-0.05 % External Cream, APPL Position: Sitting, Cuff Size: large)   Pulse 102   Temp 97 °F (36.1 °C) (Other)   Resp 20   Ht 5' 9\" (1.753 m)   Wt 254 lb (115.2 kg)   BMI 37.51 kg/m²   Estimated body mass index is 37.51 kg/m² as calculated from the following:    Height as of this encou Testes: Normal.         Right: Mass, tenderness or swelling not present. Right testis is descended. Left: Mass, tenderness or swelling not present. Left testis is descended. Prostate: Normal.      Rectum: Normal. Guaiac result negative.  Annia Wakefield completed in 2020. Orders provided at this time. He is usually followed per urology at [facility].  He is asymptomatic at this time. Chronic atrial fibrillation (HCC)     Chronic atrial fibrillation, on Imbruvica since 2016.   Monitored scattered bruising. He does not have any hepatosplenomegaly on examination today. No palpable lymphadenopathy. Continue to follow-up.            Essential hypertension     Blood pressure 118/84, pulse 102, temperature 97 °F (36.1 °C), temperature source taking Shingrix as discussed 2 shots 2 to 4 months apart about 4 weeks after his Pneumovax. Monthly testing for Covid vaccine recommended. He is advised to be very cautious with exposure.   He is advised to keep the mask on with all his dealings with ot kg)  11/17/20 : 252 lb (114.3 kg)  11/10/20 : 252 lb (114.3 kg)  10/27/20 : 252 lb (114.3 kg)     Gradual weight loss noted.   Advised to continue with strict diet control–portion size restriction, avoidance of desserts and unnecessary fried foods, fatty fo sleep apnea syndrome    Body mass index (BMI) 37.0-37.9, adult   -     BEHAVIOR  OBESITY 15M    Encounter for annual health examination    Body mass index (BMI) of 37.0-37.9 in adult    Chronic maxillary sinusitis    Prostate cancer screening  - Aneurysm (AAA) Covered once in a lifetime for one of the following risk factors   • Men who are 73-68 years old and have ever smoked   • Anyone with a family history -     Colorectal Cancer Screening  Covered for ages 52-80; only need ONE of the following: index is 37.51 kg/m². HPI:   Sam Mcfarlane was screened and found to have a BMI => 30, and is alert and competent for counseling on weight loss.     I performed a dietary (nutritional) assessment and Intensive behavioral counseling and behavioral therap

## 2021-08-17 NOTE — ASSESSMENT & PLAN NOTE
Patient has been doing well. He has been off Lamictal.  No significant mood variations at this time. He is monitored on a regular basis per psychiatry.

## 2021-08-17 NOTE — ASSESSMENT & PLAN NOTE
Patient was diagnosed with COVID-19 infection on December 10, he was treated with Bamlanivimab. He is currently asymptomatic excepting for recurrent sinus infections without fevers. He was given a [employer] vaccine, last dose completed in April.   Give

## 2021-08-17 NOTE — ASSESSMENT & PLAN NOTE
Immunocompromise due to ongoing treatment for non-Hodgkin's lymphoma. He is on [place]. He has tolerated medications well. Recent immunoglobulin level estimates show reduction in all IgG, IgM and IgA levels. Need for immunizations discussed.   He wi

## 2021-08-17 NOTE — ASSESSMENT & PLAN NOTE
Recurrent episodes of chronic maxillary sinusitis, eustachian tube dysfunction. Given multiple risks patient is advised to follow-up with E[place] at [facility]. He will call his oncologist at [facility] for referrals.

## 2021-08-17 NOTE — ASSESSMENT & PLAN NOTE
Patient is under treatment per Presley Stout. Initial diagnosis in 2009 and was in observation until 2012 at which time he was started on Rituxan every 6 months.   He developed worsening anemia and repeat bone marrow exam at the

## 2021-08-17 NOTE — ASSESSMENT & PLAN NOTE
Patient has been seen by rheumatology for his psoriatic arthropathy and inflammatory polyarthritis secondary to psoriasis. Currently symptoms are stable. He will follow-up as directed with rheumatology/dermatology. Referral for Dr. Macey Chung provided.

## 2021-08-19 ENCOUNTER — LAB ENCOUNTER (OUTPATIENT)
Dept: LAB | Facility: HOSPITAL | Age: 66
End: 2021-08-19
Attending: INTERNAL MEDICINE
Payer: MEDICARE

## 2021-08-19 DIAGNOSIS — Z12.5 PROSTATE CANCER SCREENING: ICD-10-CM

## 2021-08-19 DIAGNOSIS — I10 ESSENTIAL HYPERTENSION: ICD-10-CM

## 2021-08-19 DIAGNOSIS — Z12.11 COLON CANCER SCREENING: ICD-10-CM

## 2021-08-19 LAB
ALBUMIN SERPL-MCNC: 4 G/DL (ref 3.4–5)
ALBUMIN/GLOB SERPL: 1.4 {RATIO} (ref 1–2)
ALP LIVER SERPL-CCNC: 69 U/L
ALT SERPL-CCNC: 27 U/L
ANION GAP SERPL CALC-SCNC: 6 MMOL/L (ref 0–18)
AST SERPL-CCNC: 24 U/L (ref 15–37)
BASOPHILS # BLD AUTO: 0.04 X10(3) UL (ref 0–0.2)
BASOPHILS NFR BLD AUTO: 0.4 %
BILIRUB SERPL-MCNC: 1 MG/DL (ref 0.1–2)
BUN BLD-MCNC: 12 MG/DL (ref 7–18)
BUN/CREAT SERPL: 15.2 (ref 10–20)
CALCIUM BLD-MCNC: 8.9 MG/DL (ref 8.5–10.1)
CHLORIDE SERPL-SCNC: 106 MMOL/L (ref 98–112)
CHOLEST SMN-MCNC: 245 MG/DL (ref ?–200)
CO2 SERPL-SCNC: 28 MMOL/L (ref 21–32)
COMPLEXED PSA SERPL-MCNC: 0.66 NG/ML (ref ?–4)
CREAT BLD-MCNC: 0.79 MG/DL
DEPRECATED RDW RBC AUTO: 44.5 FL (ref 35.1–46.3)
EOSINOPHIL # BLD AUTO: 0.07 X10(3) UL (ref 0–0.7)
EOSINOPHIL NFR BLD AUTO: 0.8 %
ERYTHROCYTE [DISTWIDTH] IN BLOOD BY AUTOMATED COUNT: 13.3 % (ref 11–15)
GLOBULIN PLAS-MCNC: 2.9 G/DL (ref 2.8–4.4)
GLUCOSE BLD-MCNC: 118 MG/DL (ref 70–99)
HCT VFR BLD AUTO: 48.8 %
HDLC SERPL-MCNC: 39 MG/DL (ref 40–59)
HEMOCCULT STL QL: NEGATIVE
HGB BLD-MCNC: 16.1 G/DL
IMM GRANULOCYTES # BLD AUTO: 0.03 X10(3) UL (ref 0–1)
IMM GRANULOCYTES NFR BLD: 0.3 %
LDLC SERPL CALC-MCNC: 166 MG/DL (ref ?–100)
LYMPHOCYTES # BLD AUTO: 4.52 X10(3) UL (ref 1–4)
LYMPHOCYTES NFR BLD AUTO: 50 %
M PROTEIN MFR SERPL ELPH: 6.9 G/DL (ref 6.4–8.2)
MCH RBC QN AUTO: 30 PG (ref 26–34)
MCHC RBC AUTO-ENTMCNC: 33 G/DL (ref 31–37)
MCV RBC AUTO: 91 FL
MONOCYTES # BLD AUTO: 0.5 X10(3) UL (ref 0.1–1)
MONOCYTES NFR BLD AUTO: 5.5 %
NEUTROPHILS # BLD AUTO: 3.88 X10 (3) UL (ref 1.5–7.7)
NEUTROPHILS # BLD AUTO: 3.88 X10(3) UL (ref 1.5–7.7)
NEUTROPHILS NFR BLD AUTO: 43 %
NONHDLC SERPL-MCNC: 206 MG/DL (ref ?–130)
OSMOLALITY SERPL CALC.SUM OF ELEC: 291 MOSM/KG (ref 275–295)
PATIENT FASTING Y/N/NP: YES
PATIENT FASTING Y/N/NP: YES
PLATELET # BLD AUTO: 151 10(3)UL (ref 150–450)
POTASSIUM SERPL-SCNC: 4.5 MMOL/L (ref 3.5–5.1)
RBC # BLD AUTO: 5.36 X10(6)UL
SODIUM SERPL-SCNC: 140 MMOL/L (ref 136–145)
TRIGL SERPL-MCNC: 214 MG/DL (ref 30–149)
TSI SER-ACNC: 1.4 MIU/ML (ref 0.36–3.74)
VLDLC SERPL CALC-MCNC: 43 MG/DL (ref 0–30)
WBC # BLD AUTO: 9 X10(3) UL (ref 4–11)

## 2021-08-19 PROCEDURE — 80053 COMPREHEN METABOLIC PANEL: CPT

## 2021-08-19 PROCEDURE — 85025 COMPLETE CBC W/AUTO DIFF WBC: CPT

## 2021-08-19 PROCEDURE — 80061 LIPID PANEL: CPT

## 2021-08-19 PROCEDURE — 82274 ASSAY TEST FOR BLOOD FECAL: CPT

## 2021-08-19 PROCEDURE — 36415 COLL VENOUS BLD VENIPUNCTURE: CPT

## 2021-08-19 PROCEDURE — 84443 ASSAY THYROID STIM HORMONE: CPT

## 2021-08-23 ENCOUNTER — TELEPHONE (OUTPATIENT)
Dept: INTERNAL MEDICINE CLINIC | Facility: CLINIC | Age: 66
End: 2021-08-23

## 2021-08-23 NOTE — TELEPHONE ENCOUNTER
If he cannot get the [facility], go ahead and take [employer] vaccine.

## 2021-08-23 NOTE — TELEPHONE ENCOUNTER
Pt calling stating  informed him to get a COVID booster shot and to make sure that it was Elonda David. But pt states when he tried to get it since he had Lele Garrett originally according to the [facility] he was told that he can't have the Elonda David.    Pt requesting call

## 2022-03-31 ENCOUNTER — OFFICE VISIT (OUTPATIENT)
Dept: OTOLARYNGOLOGY | Facility: CLINIC | Age: 67
End: 2022-03-31
Payer: MEDICARE

## 2022-03-31 ENCOUNTER — NURSE ONLY (OUTPATIENT)
Dept: AUDIOLOGY | Facility: CLINIC | Age: 67
End: 2022-03-31
Payer: MEDICARE

## 2022-03-31 VITALS — BODY MASS INDEX: 37.62 KG/M2 | HEIGHT: 69 IN | TEMPERATURE: 98 F | WEIGHT: 254 LBS

## 2022-03-31 DIAGNOSIS — J32.9 CHRONIC RECURRENT SINUSITIS: ICD-10-CM

## 2022-03-31 DIAGNOSIS — H66.3X1 CHRONIC SUPPURATIVE OTITIS MEDIA OF RIGHT EAR, UNSPECIFIED OTITIS MEDIA LOCATION: Primary | ICD-10-CM

## 2022-03-31 PROCEDURE — 92504 EAR MICROSCOPY EXAMINATION: CPT | Performed by: OTOLARYNGOLOGY

## 2022-03-31 PROCEDURE — 99213 OFFICE O/P EST LOW 20 MIN: CPT | Performed by: OTOLARYNGOLOGY

## 2022-03-31 RX ORDER — AMOXICILLIN AND CLAVULANATE POTASSIUM 875; 125 MG/1; MG/1
1 TABLET, FILM COATED ORAL 2 TIMES DAILY
Qty: 28 TABLET | Refills: 1 | Status: SHIPPED | OUTPATIENT
Start: 2022-03-31 | End: 2022-04-14

## 2022-03-31 RX ORDER — NEOMYCIN SULFATE, POLYMYXIN B SULFATE AND HYDROCORTISONE 10; 3.5; 1 MG/ML; MG/ML; [USP'U]/ML
3 SUSPENSION/ DROPS AURICULAR (OTIC) 3 TIMES DAILY
Qty: 10 ML | Refills: 1 | Status: SHIPPED | OUTPATIENT
Start: 2022-03-31 | End: 2022-04-10

## 2022-03-31 RX ORDER — PREDNISONE 20 MG/1
TABLET ORAL
Qty: 7 TABLET | Refills: 0 | Status: SHIPPED | OUTPATIENT
Start: 2022-03-31

## 2022-04-12 ENCOUNTER — OFFICE VISIT (OUTPATIENT)
Dept: OTOLARYNGOLOGY | Facility: CLINIC | Age: 67
End: 2022-04-12
Payer: MEDICARE

## 2022-04-12 VITALS — WEIGHT: 244 LBS | HEIGHT: 69 IN | BODY MASS INDEX: 36.14 KG/M2

## 2022-04-12 DIAGNOSIS — H66.3X1 CHRONIC SUPPURATIVE OTITIS MEDIA OF RIGHT EAR, UNSPECIFIED OTITIS MEDIA LOCATION: Primary | ICD-10-CM

## 2022-04-12 PROCEDURE — 99213 OFFICE O/P EST LOW 20 MIN: CPT | Performed by: OTOLARYNGOLOGY

## 2022-04-12 RX ORDER — AMOXICILLIN AND CLAVULANATE POTASSIUM 875; 125 MG/1; MG/1
1 TABLET, FILM COATED ORAL EVERY 12 HOURS
Qty: 20 TABLET | Refills: 0 | Status: SHIPPED | OUTPATIENT
Start: 2022-04-12

## 2022-06-16 ENCOUNTER — OFFICE VISIT (OUTPATIENT)
Dept: AUDIOLOGY | Facility: CLINIC | Age: 67
End: 2022-06-16

## 2022-06-16 ENCOUNTER — OFFICE VISIT (OUTPATIENT)
Dept: OTOLARYNGOLOGY | Facility: CLINIC | Age: 67
End: 2022-06-16
Payer: MEDICARE

## 2022-06-16 DIAGNOSIS — H90.3 SENSORINEURAL HEARING LOSS, BILATERAL: Primary | ICD-10-CM

## 2022-06-16 DIAGNOSIS — H65.31 CHRONIC MUCOID OTITIS MEDIA OF RIGHT EAR: ICD-10-CM

## 2022-06-16 DIAGNOSIS — H90.A31 MIXED CONDUCTIVE AND SENSORINEURAL HEARING LOSS OF RIGHT EAR WITH RESTRICTED HEARING OF LEFT EAR: Primary | ICD-10-CM

## 2022-06-16 PROCEDURE — 92567 TYMPANOMETRY: CPT | Performed by: AUDIOLOGIST

## 2022-06-16 PROCEDURE — 92557 COMPREHENSIVE HEARING TEST: CPT | Performed by: AUDIOLOGIST

## 2022-06-16 PROCEDURE — 99998 NO SHOW: CPT | Performed by: AUDIOLOGIST

## 2022-06-16 PROCEDURE — 99214 OFFICE O/P EST MOD 30 MIN: CPT | Performed by: OTOLARYNGOLOGY

## 2022-06-16 RX ORDER — MONTELUKAST SODIUM 10 MG/1
10 TABLET ORAL NIGHTLY
Qty: 30 TABLET | Refills: 3 | Status: SHIPPED | OUTPATIENT
Start: 2022-06-16

## 2022-06-16 RX ORDER — METHYLPREDNISOLONE 4 MG/1
TABLET ORAL
Qty: 1 EACH | Refills: 0 | Status: SHIPPED | OUTPATIENT
Start: 2022-06-16

## 2022-06-16 RX ORDER — LEVOCETIRIZINE DIHYDROCHLORIDE 5 MG/1
5 TABLET, FILM COATED ORAL EVERY EVENING
Qty: 30 TABLET | Refills: 11 | Status: SHIPPED | OUTPATIENT
Start: 2022-06-16

## 2022-06-16 RX ORDER — AMOXICILLIN 500 MG/1
500 CAPSULE ORAL 3 TIMES DAILY
Qty: 30 CAPSULE | Refills: 0 | Status: SHIPPED | OUTPATIENT
Start: 2022-06-16 | End: 2022-06-26

## 2022-06-16 RX ORDER — FLUTICASONE PROPIONATE 50 MCG
2 SPRAY, SUSPENSION (ML) NASAL DAILY
Qty: 3 EACH | Refills: 4 | Status: SHIPPED | OUTPATIENT
Start: 2022-06-16

## 2022-06-20 PROBLEM — H90.3 SENSORINEURAL HEARING LOSS, BILATERAL: Status: ACTIVE | Noted: 2022-06-20

## 2022-09-08 ENCOUNTER — TELEPHONE (OUTPATIENT)
Dept: GENETICS | Age: 67
End: 2022-09-08

## 2022-09-08 NOTE — TELEPHONE ENCOUNTER
Patients wife calling. Patient has Leukemia. For the lab testing  stated he can not have a blood test done.   Asking if there is a skin test.  And had a question regarding a Garcia test    Appointment is tomorrow     Please call Paty    Thank you

## 2022-09-09 ENCOUNTER — GENETICS ENCOUNTER (OUTPATIENT)
Dept: GENETICS | Facility: HOSPITAL | Age: 67
End: 2022-09-09
Attending: GENETIC COUNSELOR, MS
Payer: MEDICARE

## 2022-09-09 ENCOUNTER — APPOINTMENT (OUTPATIENT)
Dept: HEMATOLOGY/ONCOLOGY | Facility: HOSPITAL | Age: 67
End: 2022-09-09
Attending: GENETIC COUNSELOR, MS
Payer: MEDICARE

## 2022-09-09 DIAGNOSIS — Z84.81 FAMILY HISTORY OF BRCA1 GENE POSITIVE: ICD-10-CM

## 2022-09-09 DIAGNOSIS — C85.99 LYMPHOMA OF BONE MARROW WITH UNKNOWN EBV STATUS (HCC): Primary | ICD-10-CM

## 2022-09-09 PROCEDURE — 96040 HC GENETIC COUNSELING EA 30 MIN: CPT | Performed by: GENETIC COUNSELOR, MS

## 2022-09-26 ENCOUNTER — TELEPHONE (OUTPATIENT)
Facility: LOCATION | Age: 67
End: 2022-09-26

## 2022-09-26 ENCOUNTER — OFFICE VISIT (OUTPATIENT)
Facility: LOCATION | Age: 67
End: 2022-09-26

## 2022-09-26 VITALS — HEART RATE: 107 BPM | TEMPERATURE: 98 F

## 2022-09-26 DIAGNOSIS — C91.10 CLL (CHRONIC LYMPHOCYTIC LEUKEMIA) (HCC): Primary | ICD-10-CM

## 2022-09-26 DIAGNOSIS — Z13.79 GENETIC TESTING: ICD-10-CM

## 2022-09-26 PROBLEM — C85.90 NON-HODGKIN LYMPHOMA (HCC): Status: ACTIVE | Noted: 2022-09-26

## 2022-09-26 PROBLEM — C85.90 NON-HODGKIN LYMPHOMA (HCC): Status: RESOLVED | Noted: 2022-09-26 | Resolved: 2022-09-26

## 2022-09-26 NOTE — TELEPHONE ENCOUNTER
[address]    SPOKE TO [place] [phone].  Mita Pettit #[id number]    [facility] #[id number]

## 2022-09-27 NOTE — PROCEDURES
[facility]  Op Note    DATE OF OPERATION:  9/26/2022  PREOPERATIVE DIAGNOSIS: CLL  POSTOPERATIVE DIAGNOSIS: CLL  PROCEDURE PERFORMED: 4 mm punch biopsy of skin of left upper arm  SURGEON:  Speedy Alva MD  ANESTHESIA: 1% lidocaine. SPECIMEN: Skin for genetic testing. BLOOD LOSS: 3 mL. COMPLICATIONS: None. OPERATIVE TECHNIQUE: After verbal informed consent was obtained, the patient was placed in supine position. An area was identified on his lateral left upper arm. It was cleansed with alcohol. 1% lidocaine was injected in the subcutaneous tissues. Using a 4 mm punch biopsy, a sample of skin and subcutaneous tissue was taken. This was placed in the media provided by the genetic testing facility. Pressure was applied for hemostasis. Steri-Strips were placed over the punch biopsy site. A sterile dressing was also applied. The patient tolerated the procedure well and was discharged home in good condition.     Speedy Alva MD

## 2022-09-27 NOTE — TELEPHONE ENCOUNTER
Per pt pharmacy has reached out for refill on montelukast and has not gotten response.  Pt states rx will be finished today, please advise

## 2022-10-01 RX ORDER — MONTELUKAST SODIUM 10 MG/1
10 TABLET ORAL NIGHTLY
Qty: 90 TABLET | Refills: 1 | Status: SHIPPED | OUTPATIENT
Start: 2022-10-01

## 2022-10-28 ENCOUNTER — GENETICS ENCOUNTER (OUTPATIENT)
Dept: HEMATOLOGY/ONCOLOGY | Facility: HOSPITAL | Age: 67
End: 2022-10-28

## 2022-10-28 NOTE — PROGRESS NOTES
Referring Provider:                    Azra Nascimento MD     Additional Provider(s):              MD Carri Landry APRN Chriss Cote, MD Annie Miu, MD    Reason for Referral:  Bobbi Snow had genetic testing performed on 9/29/22 because of a family history of cancer and a known familial BRCA1 pathogenic variant. Testing was performed on cultured fibroblasts because Mr. Darvin Headley has a personal diagnosis of an active hematological disorder. Genetic Testing Result:  NEGATIVE - Mr. Darvin Headley does not carry the known maternal pathogenic variant, BRCA1 exon 14-20 [id number]. In addition, no known pathogenic variants were found in 102 additional genes including: ADA, ANKRD26*, APC*, KATHY*, AXIN2, BAP1, BARD1, BLM, BMPR1A, BRCA1, BRCA2, BRIP1, CARD11, CARMIL2, CASP8, CBL, CD27, CDH1, CDK4, CDKN2A (p14ARF), CDKN2A (t13UBN2n), CEBPA, CHEK2, CTLA4, CTNNA1, CTPS1, DDX41, DICER1*, DOCK8, ELANE, EPCAM*, NRYN7F6, ETV6, FADD, FAS, FASLG, FCHO1, G6PC3, GATA2, GFI1*, GREM1*, HAX1, HOXB13, IKZF1, IL2RA, IL2RB, ITK, KIT, KRAS, MAGT1, MCM4, MECOM, MEN1*, MLH1*, MSH2*, MSH3*, MSH6*, MUTYH, NBN, NF1*, NTHL1, PALB2, PDGFRA, PIK3CD, PIK3R1, PMS2*, POLD1*, POLE, PRKCD, PTEN*, PTPN11, RAC2, RAD50, RAD51C, RAD51D, RASGRP1, RHOH, RMRP, RTEL1, RUNX1, SAMD9, SAMD9L, SDHA*, SDHB, SDHC*, SDHD, SH2D1A, SMAD4, SMARCA4, SRP72, STAT3, STK11, STK4, STXBP2, TERC, TERT, PQEWDN60O, TP53, TPP2, TSC1*, TSC2, VHL, WAS, XIAP. A variant of uncertain significance, BLM [id number] ([id number]), was identified. Please refer to the report from [facility] ([id number]) for additional testing information. These results were discussed with Mr. Darvin Headley by phone on 10/28/22. Summary and Plan:  Mr. Darvin Headley does not carry the known familial BRCA1 pathogenic variant.  Mr. Silvia Alvarado daughter is not at-risk to inherit this mutation from Mr. Enrrique. In addition, no known pathogenic variants were identified in 102 additional genes associated with hereditary cancer predisposition syndromes. The limitations of the testing include the chance that a pathogenic variant in a gene other than those included in this analysis might be the cause of cancer in Mr. Severiano Mire. It is not known if the BLM variant identified in Mr. Severiano Mire is associated with an increased risk for cancer or if it is a benign polymorphism. At this time, no alteration in Mr. Sheri Mcdermott medical recommendations should be made based on this variant. As more families are tested and/or functional studies are performed, it is possible that this variant will be reclassified in the future. Mr. Severiano Mire should contact me on an annual basis to see if the VUS has been reclassified and to learn if there have been any updates in genetic testing that would apply to him. In the meantime, Mr. Severiano Mire and his relatives should speak with their physicians to discuss recommended medical management according to their personal and family history.     Cc:  Mardeen Cost

## 2022-11-01 ENCOUNTER — HOSPITAL ENCOUNTER (EMERGENCY)
Facility: HOSPITAL | Age: 67
Discharge: HOME | End: 2022-11-01
Attending: EMERGENCY MEDICINE
Payer: MEDICARE

## 2022-11-01 VITALS
TEMPERATURE: 98 F | WEIGHT: 245 LBS | OXYGEN SATURATION: 97 % | RESPIRATION RATE: 26 BRPM | BODY MASS INDEX: 36.29 KG/M2 | DIASTOLIC BLOOD PRESSURE: 73 MMHG | HEIGHT: 69 IN | SYSTOLIC BLOOD PRESSURE: 134 MMHG | HEART RATE: 85 BPM

## 2022-11-01 DIAGNOSIS — C91.10 CLL (CHRONIC LYMPHOCYTIC LEUKEMIA) (HCC): ICD-10-CM

## 2022-11-01 DIAGNOSIS — R06.02 SOB (SHORTNESS OF BREATH): Primary | ICD-10-CM

## 2022-11-01 LAB
ANION GAP SERPL CALC-SCNC: 9 MMOL/L (ref 0–18)
BASOPHILS # BLD AUTO: 0.1 X10(3) UL (ref 0–0.2)
BASOPHILS NFR BLD AUTO: 0.2 %
BUN BLD-MCNC: 9 MG/DL (ref 7–18)
BUN/CREAT SERPL: 9.6 (ref 10–20)
CALCIUM BLD-MCNC: 9.3 MG/DL (ref 8.5–10.1)
CHLORIDE SERPL-SCNC: 100 MMOL/L (ref 98–112)
CO2 SERPL-SCNC: 26 MMOL/L (ref 21–32)
CREAT BLD-MCNC: 0.94 MG/DL
DEPRECATED RDW RBC AUTO: 54.6 FL (ref 35.1–46.3)
EOSINOPHIL # BLD AUTO: 0.16 X10(3) UL (ref 0–0.7)
EOSINOPHIL NFR BLD AUTO: 0.4 %
ERYTHROCYTE [DISTWIDTH] IN BLOOD BY AUTOMATED COUNT: 16.5 % (ref 11–15)
GFR SERPLBLD BASED ON 1.73 SQ M-ARVRAT: 90 ML/MIN/1.73M2 (ref 60–?)
GLUCOSE BLD-MCNC: 110 MG/DL (ref 70–99)
HCT VFR BLD AUTO: 26.4 %
HGB BLD-MCNC: 9 G/DL
IMM GRANULOCYTES # BLD AUTO: 0.47 X10(3) UL (ref 0–1)
IMM GRANULOCYTES NFR BLD: 1.1 %
LYMPHOCYTES # BLD AUTO: 30.38 X10(3) UL (ref 1–4)
LYMPHOCYTES NFR BLD AUTO: 67.9 %
MCH RBC QN AUTO: 31.6 PG (ref 26–34)
MCHC RBC AUTO-ENTMCNC: 34.1 G/DL (ref 31–37)
MCV RBC AUTO: 92.6 FL
MONOCYTES # BLD AUTO: 8.08 X10(3) UL (ref 0.1–1)
MONOCYTES NFR BLD AUTO: 18.1 %
NEUTROPHILS # BLD AUTO: 5.55 X10 (3) UL (ref 1.5–7.7)
NEUTROPHILS # BLD AUTO: 5.55 X10(3) UL (ref 1.5–7.7)
NEUTROPHILS NFR BLD AUTO: 12.3 %
OSMOLALITY SERPL CALC.SUM OF ELEC: 279 MOSM/KG (ref 275–295)
PLATELET # BLD AUTO: 141 10(3)UL (ref 150–450)
POTASSIUM SERPL-SCNC: 3.5 MMOL/L (ref 3.5–5.1)
RBC # BLD AUTO: 2.85 X10(6)UL
SODIUM SERPL-SCNC: 135 MMOL/L (ref 136–145)
TROPONIN I HIGH SENSITIVITY: 8 NG/L
WBC # BLD AUTO: 44.7 X10(3) UL (ref 4–11)

## 2022-11-01 PROCEDURE — 93010 ELECTROCARDIOGRAM REPORT: CPT | Performed by: INTERNAL MEDICINE

## 2022-11-01 PROCEDURE — 84484 ASSAY OF TROPONIN QUANT: CPT

## 2022-11-01 PROCEDURE — 36415 COLL VENOUS BLD VENIPUNCTURE: CPT

## 2022-11-01 PROCEDURE — 80048 BASIC METABOLIC PNL TOTAL CA: CPT

## 2022-11-01 PROCEDURE — 85025 COMPLETE CBC W/AUTO DIFF WBC: CPT | Performed by: EMERGENCY MEDICINE

## 2022-11-01 PROCEDURE — 99283 EMERGENCY DEPT VISIT LOW MDM: CPT

## 2022-11-01 PROCEDURE — 99284 EMERGENCY DEPT VISIT MOD MDM: CPT

## 2022-11-01 PROCEDURE — 84484 ASSAY OF TROPONIN QUANT: CPT | Performed by: EMERGENCY MEDICINE

## 2022-11-01 PROCEDURE — 80048 BASIC METABOLIC PNL TOTAL CA: CPT | Performed by: EMERGENCY MEDICINE

## 2022-11-01 PROCEDURE — 85025 COMPLETE CBC W/AUTO DIFF WBC: CPT

## 2022-11-01 PROCEDURE — 93005 ELECTROCARDIOGRAM TRACING: CPT

## 2022-11-01 PROCEDURE — 85060 BLOOD SMEAR INTERPRETATION: CPT | Performed by: EMERGENCY MEDICINE

## 2022-11-01 NOTE — ED INITIAL ASSESSMENT (HPI)
PT TO ER STATES HE HAS CLL AND SLL. PT STATES HE HAS BEEN SOB FOR THE LAST 2 MONTHS, STATES HE HAD HIS HGB CHECKED 5 DAYS AGO AND HIS HGB WENT FROM 14 TO 8.8. PT HAS HAD LOW HGB IN THE PAST. PT STATES SOB IS WORSE TODAY AND HE HAS CHRONIC A-FIB. PTS ONCOLOGIST ADVISED TO COME IN TO MAKE SURE SOB WASN'T DUE TO HIS A FIB. O2 SAT IS 97% ON RA, BREATHING IS LABORED.

## 2022-12-28 ENCOUNTER — NURSE ONLY (OUTPATIENT)
Facility: CLINIC | Age: 67
End: 2022-12-28

## 2022-12-28 DIAGNOSIS — Z86.010 PERSONAL HISTORY OF COLONIC POLYPS: Primary | ICD-10-CM

## 2022-12-28 DIAGNOSIS — Z12.11 COLON CANCER SCREENING: ICD-10-CM

## 2023-01-03 NOTE — PROGRESS NOTES
Colonoscopy for colon cancer screening and family history colon cancer  Golytely   MAC  Hold lisinopril day of procedure

## 2023-01-07 NOTE — PROGRESS NOTES
Scheduled for:  Colonoscopy [id number]  Provider Name:  Dr Roberto Rodriguez  Date:  7/20/2023  Location:    [facility]  Sedation:  MAC  Time:  9:00am (pt is aware to arrive at 8:00am)  Prep:  Golytely  Meds/Allergies Reconciled?:  Physician reviewed   Diagnosis with codes:  History of colon polyps Z86.010 Colon screening Z12.11  Was patient informed to call insurance with codes (Y/N):  Yes, I confirmed  insurance with the patient. Referral sent?:  N/A  EM or [facility] notified?:  I sent an electronic request to Endo Scheduling and received a confirmation today. Medication Orders: This patient verbally confirmed that he is not taking:   Iron, blood thinners and is not diabetic  Patient is aware to HOLD Lisinopril the day of the procedure   Not latex allergy, Not PCN allergy and does not have a pacemaker  Pt is aware to NOT take iron pills, herbal meds and diet supplements for 7 days before exam. Also to NOT take any form of alcohol, recreational drugs and any forms of ED meds 24 hours before exam.       Misc Orders:  I discussed the prep instructions with the patient which he verbally understood and is aware that I will Mychart the instructions today.       Further instructions given by staff:

## 2023-01-07 NOTE — PROGRESS NOTES
Dr Emory Wu RN's    The patient is a patient of Dr Michaela Cockayne (cardiologist) at [facility] and is under his care for Afib and started new medication for this. He stated that they are planning to  shock heart back in rhythm but unsure of the date of the procedure as it has not been scheduled yet. Please advise if Cardiac clearance needed?       The Colonoscopy is scheduled on 7/20/2023 at [address] [place] [address]

## 2023-01-09 NOTE — PROGRESS NOTES
We need to get cardiology clearance before the colonoscopy - not sure how soon after the cardioversion we can set up colon.  Also would need input on if on anticoagulation

## 2023-01-12 RX ORDER — MONTELUKAST SODIUM 10 MG/1
TABLET ORAL
Qty: 90 TABLET | Refills: 1 | Status: SHIPPED | OUTPATIENT
Start: 2023-01-12

## 2023-01-13 ENCOUNTER — OFFICE VISIT (OUTPATIENT)
Dept: OTOLARYNGOLOGY | Facility: CLINIC | Age: 68
End: 2023-01-13

## 2023-01-13 DIAGNOSIS — H69.82 DYSFUNCTION OF LEFT EUSTACHIAN TUBE: Primary | ICD-10-CM

## 2023-01-13 PROCEDURE — 99213 OFFICE O/P EST LOW 20 MIN: CPT | Performed by: OTOLARYNGOLOGY

## 2023-01-13 RX ORDER — ALLOPURINOL 300 MG/1
TABLET ORAL
COMMUNITY
Start: 2023-01-10

## 2023-01-13 RX ORDER — CEFUROXIME AXETIL 500 MG/1
500 TABLET ORAL 3 TIMES DAILY
COMMUNITY
Start: 2023-01-04

## 2023-01-13 RX ORDER — PSEUDOEPHEDRINE HCL 120 MG/1
120 TABLET, FILM COATED, EXTENDED RELEASE ORAL EVERY 12 HOURS
Qty: 60 TABLET | Refills: 3 | Status: SHIPPED | OUTPATIENT
Start: 2023-01-13

## 2023-02-13 ENCOUNTER — TELEPHONE (OUTPATIENT)
Facility: CLINIC | Age: 68
End: 2023-02-13

## 2023-02-13 DIAGNOSIS — Z12.11 SCREEN FOR COLON CANCER: ICD-10-CM

## 2023-02-13 DIAGNOSIS — Z86.010 HX OF COLONIC POLYPS: Primary | ICD-10-CM

## 2023-07-17 NOTE — TELEPHONE ENCOUNTER
EMERGENCY DEPARTMENT ENCOUNTER    Room Number:    Date seen:  2023  PCP: Bakari Selby MD  Historian: Patient      HPI:  Chief Complaint: Right-sided weakness  A complete HPI/ROS/PMH/PSH/SH/FH are unobtainable due to:   Context: Nino Kat is a 89 y.o. male who presents to the ED c/o right-sided weakness.  Patient states that he has had increasing weakness of the right upper and lower extremity ongoing since February of this year.  Symptoms were worsened over the weekend.  Patient generally has walked without a walker but has had to use a walker over the last week or so.  He has difficulty using the walker because of his right arm weakness.  Denies headache, chest pain or trouble breathing.  He is compliant with his medications.  Denies recent illness.  Does state that he had shingles around the right eye in April of this year.  Patient is a non-smoker, quit in the .  He does not drink.  Patient lives in assisted living.      MEDICAL RECORD REVIEW (non ED)  I reviewed prior medical records including most recent cardiology office note with Dr. Ramírez.  Patient has a history of hypertension and takes Cozaar and Bumex.    PAST MEDICAL HISTORY  Active Ambulatory Problems     Diagnosis Date Noted    Abnormal EKG 10/27/2021    Primary hypertension 10/27/2021     Resolved Ambulatory Problems     Diagnosis Date Noted    No Resolved Ambulatory Problems     Past Medical History:   Diagnosis Date    Hypertension     Rotator cuff disorder          PAST SURGICAL HISTORY  Past Surgical History:   Procedure Laterality Date    ROTATOR CUFF REPAIR Left     TOTAL HIP ARTHROPLASTY Left          FAMILY HISTORY  Family History   Family history unknown: Yes         SOCIAL HISTORY  Social History     Socioeconomic History    Marital status:    Tobacco Use    Smoking status: Former     Types: Cigarettes     Quit date:      Years since quittin.5    Smokeless tobacco: Never   Vaping Use     Another 10 days of Augmentin 875 b.i.d. Vaping Use: Never used   Substance and Sexual Activity    Alcohol use: Yes     Comment: OCCASIONAL    Drug use: Never    Sexual activity: Defer         ALLERGIES  Diclofenac        REVIEW OF SYSTEMS  Review of Systems   Constitutional:  Positive for unexpected weight change (Mild weight gain over the last several months). Negative for fever.   Respiratory:  Negative for shortness of breath.    Cardiovascular:  Negative for chest pain.   Neurological:  Positive for weakness (Right-sided weakness as per HPI).   All other systems reviewed and are negative.         PHYSICAL EXAM  ED Triage Vitals [07/17/23 1015]   Temp Heart Rate Resp BP SpO2   97.8 °F (36.6 °C) 65 16 159/86 95 %      Temp src Heart Rate Source Patient Position BP Location FiO2 (%)   Tympanic Monitor Lying -- --       Physical Exam    GENERAL: Alert male in no obvious distress appears younger than stated age.  Triage vitals notable for blood pressure 159/86.  Temperature and heart rate are unremarkable.  HENT: nares patent  EYES: no scleral icterus  CV: regular rhythm, regular rate-no murmur  RESPIRATORY: normal effort, clear to auscultation bilaterally  ABDOMEN: soft, nontender to palpation  MUSCULOSKELETAL: no deformity  NEURO: Strength-upper extremity strength normal.  Facial strength normal.  Lower extremities reveals mild drift with the right leg.  Left leg normal.  Sensation and coordination are grossly intact.  Speech and mentation are unremarkable  SKIN: warm, dry-no unusual rashes are noted      Vital signs and nursing notes reviewed.          LAB RESULTS  Recent Results (from the past 24 hour(s))   Green Top (Gel)    Collection Time: 07/17/23 10:28 AM   Result Value Ref Range    Extra Tube Hold for add-ons.    Lavender Top    Collection Time: 07/17/23 10:28 AM   Result Value Ref Range    Extra Tube hold for add-on    Gold Top - SST    Collection Time: 07/17/23 10:28 AM   Result Value Ref Range    Extra Tube Hold for add-ons.    Light Blue Top     Collection Time: 07/17/23 10:28 AM   Result Value Ref Range    Extra Tube Hold for add-ons.    Comprehensive Metabolic Panel    Collection Time: 07/17/23 10:28 AM    Specimen: Blood   Result Value Ref Range    Glucose 98 65 - 99 mg/dL    BUN 31 (H) 8 - 23 mg/dL    Creatinine 1.83 (H) 0.76 - 1.27 mg/dL    Sodium 138 136 - 145 mmol/L    Potassium 4.2 3.5 - 5.2 mmol/L    Chloride 101 98 - 107 mmol/L    CO2 28.0 22.0 - 29.0 mmol/L    Calcium 10.8 (H) 8.2 - 9.6 mg/dL    Total Protein 6.8 6.0 - 8.5 g/dL    Albumin 4.3 3.5 - 5.2 g/dL    ALT (SGPT) 14 1 - 41 U/L    AST (SGOT) 19 1 - 40 U/L    Alkaline Phosphatase 86 39 - 117 U/L    Total Bilirubin 0.4 0.0 - 1.2 mg/dL    Globulin 2.5 gm/dL    A/G Ratio 1.7 g/dL    BUN/Creatinine Ratio 16.9 7.0 - 25.0    Anion Gap 9.0 5.0 - 15.0 mmol/L    eGFR 34.0 (L) >60.0 mL/min/1.73   Protime-INR    Collection Time: 07/17/23 10:28 AM    Specimen: Blood   Result Value Ref Range    Protime 14.4 (H) 11.7 - 14.2 Seconds    INR 1.11 (H) 0.90 - 1.10   CBC Auto Differential    Collection Time: 07/17/23 10:28 AM    Specimen: Blood   Result Value Ref Range    WBC 8.01 3.40 - 10.80 10*3/mm3    RBC 3.18 (L) 4.14 - 5.80 10*6/mm3    Hemoglobin 11.2 (L) 13.0 - 17.7 g/dL    Hematocrit 33.3 (L) 37.5 - 51.0 %    .7 (H) 79.0 - 97.0 fL    MCH 35.2 (H) 26.6 - 33.0 pg    MCHC 33.6 31.5 - 35.7 g/dL    RDW 14.1 12.3 - 15.4 %    RDW-SD 54.5 (H) 37.0 - 54.0 fl    MPV 12.0 6.0 - 12.0 fL    Platelets 176 140 - 450 10*3/mm3    Neutrophil % 68.1 42.7 - 76.0 %    Lymphocyte % 20.3 19.6 - 45.3 %    Monocyte % 8.7 5.0 - 12.0 %    Eosinophil % 1.6 0.3 - 6.2 %    Basophil % 0.9 0.0 - 1.5 %    Immature Grans % 0.4 0.0 - 0.5 %    Neutrophils, Absolute 5.45 1.70 - 7.00 10*3/mm3    Lymphocytes, Absolute 1.63 0.70 - 3.10 10*3/mm3    Monocytes, Absolute 0.70 0.10 - 0.90 10*3/mm3    Eosinophils, Absolute 0.13 0.00 - 0.40 10*3/mm3    Basophils, Absolute 0.07 0.00 - 0.20 10*3/mm3    Immature Grans, Absolute 0.03 0.00 -  0.05 10*3/mm3    nRBC 0.0 0.0 - 0.2 /100 WBC   ECG 12 Lead Stroke Evaluation    Collection Time: 07/17/23 11:03 AM   Result Value Ref Range    QT Interval 397 ms       Ordered the above labs and independently interpreted results. My findings will be discussed in the medical decision making section below        RADIOLOGY  CT Head Without Contrast    Result Date: 7/17/2023  CT HEAD WITHOUT CONTRAST  HISTORY: Weakness, fall.  COMPARISON: None.  FINDINGS: The brain and ventricles are symmetrical. There is age-appropriate atrophy. Moderate-to-severe vascular calcification is noted. Areas of decreased attenuation involving the white matter of the cerebral hemispheres are noted bilaterally consistent with mild-to-moderate small vessel ischemic disease. There is no evidence of a hemorrhage, hydrocephalus or of a focal area of decreased attenuation to suggest acute infarction. Further evaluation could be performed with an MRI examination of the brain as indicated.    Radiation dose reduction techniques were utilized, including automated exposure control and exposure modulation based on body size.        I ordered and independently reviewed the above noted radiographic studies.      I viewed images of CT brain which showed no obvious stroke or hemorrhage per my independent interpretation.    See radiologist's dictation for official interpretation.             PROCEDURES  Procedures          MEDICATIONS GIVEN IN ER  Medications   sodium chloride 0.9 % flush 10 mL (has no administration in time range)   sodium chloride 0.9 % infusion (125 mL/hr Intravenous New Bag 7/17/23 1235)               MEDICAL DECISION MAKING, PROGRESS, and CONSULTS    All labs have been independently reviewed by me.  All radiology studies have been reviewed by me and I have also reviewed the radiology report.   EKG's independently viewed and interpreted by me.  Discussion below represents my analysis of pertinent findings related to patient's condition,  differential diagnosis, treatment plan and final disposition.      Additional sources:  - Discussed/ obtained information from independent historians: EMS who brought patient    - External (non-ED) record review: Please see documented above    - Chronic or social conditions impacting care: Hypertension, age 89    - Shared decision making: I discussed ED evaluation and treatment plan with patient who is in agreement.  89-year-old male with history of hypertension brought with worsening progressive right-sided weakness involving the arm and leg.    On exam there is noted right leg weakness without appreciable right arm weakness.    Labs did show worsening of renal failure with creatinine of 1.83 up from baseline.  Treated with some IV fluids.    CT scan of the brain did not show obvious cause of right-sided weakness.  No obvious tumor, stroke or hemorrhage.    Spoke with [facility], Dr. Edwin Cruz who will admit for further evaluation treatment.      Orders placed during this visit:  Orders Placed This Encounter   Procedures    CT Head Without Contrast    [place] Draw    Comprehensive Metabolic Panel    Protime-INR    CBC Auto Differential    [facility] (on-call MD unless specified) Details    ECG 12 Lead Stroke Evaluation    Insert Peripheral IV    Inpatient Admission    Green Top (Gel)    Lavender Top    Gold Top - SST    Gray Top    Light Blue Top    CBC & Differential           Differential diagnosis:    Please see as documented below in ED course      Independent interpretation of labs, radiology studies, and discussions with consultants:  ED Course as of 07/17/23 1326   Mon Jul 17, 2023   [age] [id number]-old male with history of hypertension presents with progressive right-sided weakness ongoing for many months.  States his symptoms been worsened over the last week.    On exam he is well-appearing and looks younger than stated age.  He does have some demonstrable weakness of the right leg with mild  drift.    Assessment-etiology of right-sided weakness unclear.  With this long progression I would be worried about space-occupying lesion within the brain, possible tumor.  Patient is not tPA candidate secondary to onset of many months and less likelihood of stroke. [DB]   1106 EKG independently interpreted by me    Time 1103    Sinus 60  P waves-normal P waves, prolonged [place] interval  QRS-left axis deviation, questionable inferior Q wave  ST, T wave-nonspecific changes    When compared to 2022, inferior Q waves are new and could or present possible infarction somewhere between now and then. [DB]   1219 Labs show some worsening of chronic renal failure with BUN or creatinine of 31 and 1.83.    CBC fairly benign with a white count of 8 and hemoglobin of 11.2.  Platelet count normal.    Labs suggest that weakness could be related to renal failure and dehydration from overdiuresis. [DB]   1220 At this point with ongoing right-sided weakness would suggest admission with neurology consultation. [DB]   1324 I discussed treatment and evaluation this patient with Dr. Edwin Cruz who will admit on behalf of [facility]. [DB]      ED Course User Index  [DB] Mario Hawk MD               DIAGNOSIS  Final diagnoses:   Right sided weakness   Acute renal failure, unspecified acute renal failure type         DISPOSITION  Admission            Latest Documented Vital Signs:  As of 13:26 EDT  BP- 131/76 HR- 70 Temp- 97.8 °F (36.6 °C) (Tympanic) O2 sat- 96%              --    Please note that portions of this were completed with a voice recognition program.       Note Disclaimer: At [facility], we believe that sharing information builds trust and better relationships. You are receiving this note because you are receiving care at [facility] or recently visited. It is possible you will see health information before a provider has talked with you about it. This kind of information can be easy to misunderstand. To help you fully  understand what it means for your health, we urge you to discuss this note with your provider.             Mario Hawk MD  07/17/23 1590

## 2023-07-18 RX ORDER — MONTELUKAST SODIUM 10 MG/1
TABLET ORAL
Qty: 90 TABLET | Refills: 1 | Status: SHIPPED | OUTPATIENT
Start: 2023-07-18

## 2023-07-20 ENCOUNTER — OFFICE VISIT (OUTPATIENT)
Dept: OTOLARYNGOLOGY | Facility: CLINIC | Age: 68
End: 2023-07-20

## 2023-07-20 ENCOUNTER — OFFICE VISIT (OUTPATIENT)
Dept: AUDIOLOGY | Facility: CLINIC | Age: 68
End: 2023-07-20

## 2023-07-20 DIAGNOSIS — H91.90 HEARING LOSS, UNSPECIFIED HEARING LOSS TYPE, UNSPECIFIED LATERALITY: Primary | ICD-10-CM

## 2023-07-20 DIAGNOSIS — H90.71 MIXED CONDUCTIVE AND SENSORINEURAL HEARING LOSS OF RIGHT EAR WITH UNRESTRICTED HEARING OF LEFT EAR: Primary | ICD-10-CM

## 2023-07-20 DIAGNOSIS — H90.42 SENSORINEURAL HEARING LOSS (SNHL) OF LEFT EAR WITH UNRESTRICTED HEARING OF RIGHT EAR: ICD-10-CM

## 2023-07-20 PROCEDURE — 92567 TYMPANOMETRY: CPT | Performed by: AUDIOLOGIST

## 2023-07-20 PROCEDURE — 92557 COMPREHENSIVE HEARING TEST: CPT | Performed by: AUDIOLOGIST

## 2023-07-20 PROCEDURE — 99213 OFFICE O/P EST LOW 20 MIN: CPT | Performed by: OTOLARYNGOLOGY

## 2023-07-20 RX ORDER — APIXABAN 5 MG/1
5 TABLET, FILM COATED ORAL 2 TIMES DAILY
COMMUNITY
Start: 2023-05-20

## 2023-07-20 RX ORDER — LEVOCETIRIZINE DIHYDROCHLORIDE 5 MG/1
5 TABLET, FILM COATED ORAL EVERY EVENING
Qty: 30 TABLET | Refills: 11 | Status: SHIPPED | OUTPATIENT
Start: 2023-07-20

## 2023-07-20 RX ORDER — TOBRAMYCIN AND DEXAMETHASONE 3; 1 MG/ML; MG/ML
3 SUSPENSION/ DROPS OPHTHALMIC EVERY 8 HOURS
Qty: 10 ML | Refills: 0 | Status: SHIPPED | OUTPATIENT
Start: 2023-07-20

## 2023-07-20 RX ORDER — CIPROFLOXACIN 500 MG/1
500 TABLET, FILM COATED ORAL EVERY 12 HOURS
Qty: 14 TABLET | Refills: 0 | Status: SHIPPED | OUTPATIENT
Start: 2023-07-20

## 2023-07-20 NOTE — PROGRESS NOTES
Gayathri Perkins is a 74year old male. Patient presents with:  Ear Problem: Reports issues hearing loss. HISTORY OF PRESENT ILLNESS  He presents with a history of signifiant throat pain since Friday of last week. No history of recurrent throat pain. No GERD signs or symptoms. No voice changes. Feels raw. Painful to eat certain foods mostly solids. No other signs, symptoms or complaints. Lo grade fevers. 7/2/2020 noticed some blood on a Q-tip a few days ago and became very worried. Currently with CLL on medication under reasonable control. Very concerned about this C[place] drainage. Long history of chronic ear infections. No other signs, symptoms or complaints. No new hearing loss. 7/24/2020 doing very well. Last visit I started him on ofloxacin eardrops as well as Augmentin and he states he has had complete resolution of his ear infection and drainage. No complaints or concerns at this time and actually feels that his hearing is better. We have talked about placing a tube in the right ear for quite some time. Having some mouth sores. He does have a history of CLL and is currently taking daily medication and states that stressors often force him to develop mouth sores. Last visit noted to have a perforation of his right tympanic membrane. 4/12/22 he has been followed by Dr. Shirley Curz for some time for chronic ear issues and nasal congestion. Recently started on Augmentin and some eardrops for suppurative otitis media on the right. Dr. Fernando Degroot did recommend placement of a tube on that side. No new signs, symptoms complaints other than persistent drainage     1/13/23 on experimental drug for his underlying CLL/lymphoma. Doing reasonably well at this time but recently got some upper respiratory tract infection from his kids over the holidays. No very congested feels like there is water in his left ear. Right ear never really hears very well.   No other signs, symptoms or complaints 23 still addressing his underlying CLL/lymphoma. Actually doing pretty well but on lots of medications. Currently on fluticasone levocetirizine montelukast and Sudafed using Sudafed on appearing basis and actually doing quite well with respect to congestion. Ears seem to be doing pretty well. When he blows his nose he hears some squeaking of air through the left ear. Repeat audiogram performed today demonstrates slight worsening of his conductive loss on the right side but he has normal downsloping to severe hearing loss bilaterally with a mixed component only on the right. Abnormal tympanograms bilaterally. States that he is having more issues hearing his wife and kids and that are complaining more about his inability to hear them. Wishes to discuss amplification. Social History    Socioeconomic History      Marital status:     Tobacco Use      Smoking status: Former        Packs/day: 1.00        Years: 33.00        Pack years: 35        Types: Cigarettes        Start date: 1970        Quit date: 2015        Years since quittin.1      Smokeless tobacco: Never      Tobacco comments: quit on and off for 10 yrs.  to , smoked 0.5-1 pk a day    Vaping Use      Vaping Use: Never used    Substance and Sexual Activity      Alcohol use: Yes        Alcohol/week: 0.0 standard drinks of alcohol        Comment: (Beer) 6 pk of beer, rarely. Drug use: No    Other Topics      Concerns:        Caffeine Concern: Yes          Coffee, 1 cup daily.         Reaction to local anesthetic: No      Family History   Problem Relation Age of Onset    Breast Cancer Mother     Other (Other) Father         aneurysm    Prostate Cancer Brother     Cancer Brother         Lung Cancer - smoker       Past Medical History:   Diagnosis Date    Arthritis     Bipolar affective ([facility] [address]     BPH (benign prostatic hyperplasia)     Leukemia ([facility] [address]     Leukemia ([facility] [address]     Lipid screening 2009    Per [employer] Lymphoma ([facility] [address] 2000    Management:  medication    Psoriasis     Sinusitis     Sleep apnea 11/20/16-Split    AHI 31 RDI 34 REM AHI 36 Supine AHI 98 non-supine AHI 17 Sao2 Nithin 81%/CPAP-8cwp/Merit       Past Surgical History:   Procedure Laterality Date    OTHER SURGICAL HISTORY  2000    Nose surgery         REVIEW OF SYSTEMS    System Neg/Pos Details   Constitutional Negative Fatigue, fever and weight loss. ENMT Negative Drooling. Eyes Negative Blurred vision and vision changes. Respiratory Negative Dyspnea and wheezing. Cardio Negative Chest pain, irregular heartbeat/palpitations and syncope. GI Negative Abdominal pain and diarrhea. Endocrine Negative Cold intolerance and heat intolerance. Neuro Negative Tremors. Psych Negative Anxiety and depression. Integumentary Negative Frequent skin infections, pigment change and rash. Hema/Lymph Negative Easy bleeding and easy bruising. PHYSICAL EXAM    There were no vitals taken for this visit. Constitutional Normal Overall appearance - Normal.   Psychiatric Normal Orientation - Oriented to time, place, person & situation. Appropriate mood and affect. Neck Exam Normal Inspection - Normal. Palpation - Normal. Parotid gland - Normal. Thyroid gland - Normal.   Eyes Normal Conjunctiva - Right: Normal, Left: Normal. Pupil - Right: Normal, Left: Normal. Fundus - Right: Normal, Left: Normal.   Neurological Normal Memory - Normal. Cranial nerves - Cranial nerves II through XII grossly intact. Head/Face Normal Facial features - Normal. Eyebrows - Normal. Skull - Normal.        Nasopharynx Normal External nose - Normal. Lips/teeth/gums - Normal. Tonsils - Normal. Oropharynx - Normal.   Ears Normal Inspection - Right: Normal, Left: Normal. Canal - Right: Normal, Left: Normal. TM - Right: Erythematous eardrum granulation tissue on drum left: Normal.   Skin Normal Inspection - Normal.        Lymph Detail Normal Submental. Submandibular.  Anterior cervical. Posterior cervical. Supraclavicular. Nose/Mouth/Throat Normal External nose - Normal. Lips/teeth/gums - Normal. Tonsils - Normal. Oropharynx - Normal.   Nose/Mouth/Throat Normal Nares - Right: Normal Left: Normal. Septum -Normal  Turbinates - Right: Normal, Left: Normal.       Current Outpatient Medications:     ELIQUIS 5 MG Oral Tab, Take 1 tablet (5 mg total) by mouth 2 (two) times daily. , Disp: , Rfl:     levocetirizine 5 MG Oral Tab, Take 1 tablet (5 mg total) by mouth every evening., Disp: 30 tablet, Rfl: 11    MONTELUKAST 10 MG Oral Tab, TAKE 1 TABLET BY MOUTH EVERY DAY AT NIGHT, Disp: 90 tablet, Rfl: 1    allopurinol 300 MG Oral Tab, , Disp: , Rfl:     cefuroxime 500 MG Oral Tab, Take 1 tablet (500 mg total) by mouth 3 (three) times daily. , Disp: , Rfl:     pseudoephedrine  MG Oral Tablet 12 Hr, Take 1 tablet (120 mg total) by mouth every 12 (twelve) hours. , Disp: 60 tablet, Rfl: 3    methylPREDNISolone 4 MG Oral Tablet Therapy Pack, Take by oral route., Disp: 1 each, Rfl: 0    fluticasone propionate 50 MCG/ACT Nasal Suspension, 2 sprays by Nasal route daily. , Disp: 3 each, Rfl: 4    sulfamethoxazole-trimethoprim -160 MG Oral Tab per tablet, , Disp: , Rfl:     lisinopril 5 MG Oral Tab, Take 1 tablet (5 mg total) by mouth daily. , Disp: , Rfl:     acyclovir 400 MG Oral Tab, Take 1 tablet (400 mg total) by mouth daily. , Disp: , Rfl:     Metoprolol Succinate  MG Oral Tablet 24 Hr, Take 1 tablet (200 mg total) by mouth daily. , Disp: 90 tablet, Rfl: 1    IMBRUVICA 420 MG Oral Tab, , Disp: , Rfl:     Clobetasol Propionate 0.05 % External Cream, APPLY TO AFFECTED AREA TWICE A DAY AS NEEDED, Disp: , Rfl: 2    metroNIDAZOLE 0.75 % External Gel, Use bid to nose for rosacea, Disp: 60 g, Rfl: 11    TADALAFIL OR, Take 5 mg by mouth daily. , Disp: , Rfl:     clotrimazole-betamethasone 1-0.05 % External Cream, APPLY TO AFFECTED AREA TWICE DAILY, Disp: 45 g, Rfl: 3    tamsulosin HCl (FLOMAX) 0.4 MG Oral Cap, 1 capsule (0.4 mg total) daily. , Disp: , Rfl:     finasteride (PROSCAR) 5 MG Oral Tab, Take 1 tablet (5 mg total) by mouth daily. , Disp: , Rfl:     amoxicillin clavulanate 875-125 MG Oral Tab, Take 1 tablet by mouth every 12 (twelve) hours. (Patient not taking: Reported on 7/20/2023), Disp: 20 tablet, Rfl: 0  ASSESSMENT AND PLAN    1. 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 I did recommend that he consider amplification and I did give him copies of his hearing test as well as a medical clearance letter. He does have a slight infectious process of the tympanic membrane on the right we will start her on ciprofloxacin for 1 week as well as TobraDex eardrops. I did asked him to follow strict water precautions return to see me in a year for refills. I did give him a refill on levocetirizine as this seems to be helping him significantly. - OP REFERRAL TO AUDIOLOGY        This note was prepared using [employer] [facility] voice recognition dictation software. As a result errors may occur. When identified these errors have been corrected. While every attempt is made to correct errors during dictation discrepancies may still exist    Xavi Johnson MD    7/20/2023    10:10 AM

## 2023-10-14 ENCOUNTER — OFFICE VISIT (OUTPATIENT)
Dept: OTOLARYNGOLOGY | Facility: CLINIC | Age: 68
End: 2023-10-14

## 2023-10-14 VITALS — BODY MASS INDEX: 37.03 KG/M2 | WEIGHT: 250 LBS | HEIGHT: 69 IN

## 2023-10-14 DIAGNOSIS — H65.02 NON-RECURRENT ACUTE SEROUS OTITIS MEDIA OF LEFT EAR: Primary | ICD-10-CM

## 2023-10-14 PROCEDURE — 99213 OFFICE O/P EST LOW 20 MIN: CPT | Performed by: OTOLARYNGOLOGY

## 2023-10-14 RX ORDER — OFLOXACIN 3 MG/ML
3 SOLUTION AURICULAR (OTIC) 3 TIMES DAILY
Qty: 10 ML | Refills: 0 | Status: SHIPPED | OUTPATIENT
Start: 2023-10-14

## 2023-10-14 RX ORDER — AMOXICILLIN AND CLAVULANATE POTASSIUM 875; 125 MG/1; MG/1
1 TABLET, FILM COATED ORAL EVERY 12 HOURS
Qty: 20 TABLET | Refills: 1 | Status: SHIPPED | OUTPATIENT
Start: 2023-10-14

## 2023-10-14 RX ORDER — PSEUDOEPHEDRINE HCL 120 MG/1
120 TABLET, FILM COATED, EXTENDED RELEASE ORAL EVERY 12 HOURS
Qty: 60 TABLET | Refills: 3 | Status: SHIPPED | OUTPATIENT
Start: 2023-10-14

## 2023-11-15 ENCOUNTER — OFFICE VISIT (OUTPATIENT)
Dept: OTOLARYNGOLOGY | Facility: CLINIC | Age: 68
End: 2023-11-15

## 2023-11-15 DIAGNOSIS — J01.01 ACUTE RECURRENT MAXILLARY SINUSITIS: ICD-10-CM

## 2023-11-15 DIAGNOSIS — J01.31 ACUTE RECURRENT SPHENOIDAL SINUSITIS: ICD-10-CM

## 2023-11-15 DIAGNOSIS — J32.4 CHRONIC PANSINUSITIS: Primary | ICD-10-CM

## 2023-11-15 DIAGNOSIS — J01.21 ACUTE RECURRENT ETHMOIDAL SINUSITIS: ICD-10-CM

## 2023-11-15 PROCEDURE — 87070 CULTURE OTHR SPECIMN AEROBIC: CPT | Performed by: OTOLARYNGOLOGY

## 2023-11-15 PROCEDURE — 87075 CULTR BACTERIA EXCEPT BLOOD: CPT | Performed by: OTOLARYNGOLOGY

## 2023-11-15 PROCEDURE — 87205 SMEAR GRAM STAIN: CPT | Performed by: OTOLARYNGOLOGY

## 2023-11-15 PROCEDURE — 87077 CULTURE AEROBIC IDENTIFY: CPT | Performed by: OTOLARYNGOLOGY

## 2023-11-15 NOTE — PROGRESS NOTES
PROGRESS NOTE  OTOLOGY/OTOLARYNGOLOGY    REF MD:  No referring provider defined for this encounter. PCP: Ana Guerra MD    CHIEF COMPLAINT:    Chief Complaint   Patient presents with    Sinus Problem     C/o sore throat, yellow mucus. X 2 days. HISTORY OF PRESENT ILLNESS: Thomas James is a 79year old male who presents for evaluation of sinus infection and chronic ear infections. At last visit with Dr. Gerry Johnson (10/14/23) he was started on Ofloxacin otic drops to the right ear and Augmentin for 10-20 days. Recommended to start Sudafed in conjunction with montelukast, nasal spray, and antihistamine. HPI PER DR. PATINO 10/14/23  He presents with a history of signifiant throat pain since Friday of last week. No history of recurrent throat pain. No GERD signs or symptoms. No voice changes. Feels raw. Painful to eat certain foods mostly solids. No other signs, symptoms or complaints. Lo grade fevers. 7/2/2020 noticed some blood on a Q-tip a few days ago and became very worried. Currently with CLL on medication under reasonable control. Very concerned about this Cár[place] drainage. Long history of chronic ear infections. No other signs, symptoms or complaints. No new hearing loss. 7/24/2020 doing very well. Last visit I started him on ofloxacin eardrops as well as Augmentin and he states he has had complete resolution of his ear infection and drainage. No complaints or concerns at this time and actually feels that his hearing is better. We have talked about placing a tube in the right ear for quite some time. Having some mouth sores. He does have a history of CLL and is currently taking daily medication and states that stressors often force him to develop mouth sores. Last visit noted to have a perforation of his right tympanic membrane. 4/12/22 he has been followed by Dr. Lon Romero for some time for chronic ear issues and nasal congestion.   Recently started on Augmentin and some eardrops for suppurative otitis media on the right. Dr. Elsa Benavides did recommend placement of a tube on that side. No new signs, symptoms complaints other than persistent drainage     1/13/23 on experimental drug for his underlying CLL/lymphoma. Doing reasonably well at this time but recently got some upper respiratory tract infection from his kids over the holidays. No very congested feels like there is water in his left ear. Right ear never really hears very well. No other signs, symptoms or complaints     7/20/23 still addressing his underlying CLL/lymphoma. Actually doing pretty well but on lots of medications. Currently on fluticasone levocetirizine montelukast and Sudafed using Sudafed on appearing basis and actually doing quite well with respect to congestion. Ears seem to be doing pretty well. When he blows his nose he hears some squeaking of air through the left ear. Repeat audiogram performed today demonstrates slight worsening of his conductive loss on the right side but he has normal downsloping to severe hearing loss bilaterally with a mixed component only on the right. Abnormal tympanograms bilaterally. States that he is having more issues hearing his wife and kids and that are complaining more about his inability to hear them. Wishes to discuss amplification. 10/14/23 history of CLL/lymphoma. Gets recurrent infections here and there. Granddaughter is very sick for the past month and now he presents with worsening ear pain over the last 24 hours on the left side drainage from the right ear and mucopurulent drainage from the nose.   No other signs, symptoms or complaint    PAST MEDICAL HISTORY:    Past Medical History:   Diagnosis Date    Arthritis     Bipolar affective ([address]     BPH (benign prostatic hyperplasia)     Leukemia ([address]     Leukemia ([address]     Lipid screening 07/24/2009    Per [facility]    Lymphoma ([address] 2000    Management:  medication    Psoriasis     Sinusitis     Sleep apnea 11/20/16-Split    AHI 31 RDI 34 REM AHI 36 Supine AHI 98 non-supine AHI 17 Sao2 Nithin 81%/CPAP-8cwp/[facility]       PAST SURGICAL HISTORY:    Past Surgical History:   Procedure Laterality Date    OTHER SURGICAL HISTORY  2000    Nose surgery       Current Outpatient Medications on File Prior to Visit   Medication Sig Dispense Refill    pseudoephedrine  MG Oral Tablet 12 Hr Take 1 tablet (120 mg total) by mouth every 12 (twelve) hours. 60 tablet 3    ofloxacin 0.3 % Otic Solution Place 3 drops in ear(s) in the morning, at noon, and at bedtime. 10 mL 0    ELIQUIS 5 MG Oral Tab Take 1 tablet (5 mg total) by mouth 2 (two) times daily. levocetirizine 5 MG Oral Tab Take 1 tablet (5 mg total) by mouth every evening. 30 tablet 11    tobramycin-dexamethasone 0.3-0.1 % Ophthalmic Suspension Place 3 drops in ear(s) every 8 (eight) hours. 10 mL 0    MONTELUKAST 10 MG Oral Tab TAKE 1 TABLET BY MOUTH EVERY DAY AT NIGHT 90 tablet 1    allopurinol 300 MG Oral Tab       pseudoephedrine  MG Oral Tablet 12 Hr Take 1 tablet (120 mg total) by mouth every 12 (twelve) hours. 60 tablet 3    methylPREDNISolone 4 MG Oral Tablet Therapy Pack Take by oral route. 1 each 0    fluticasone propionate 50 MCG/ACT Nasal Suspension 2 sprays by Nasal route daily. 3 each 4    lisinopril 5 MG Oral Tab Take 2 tablets (10 mg total) by mouth daily. acyclovir 400 MG Oral Tab Take 1 tablet (400 mg total) by mouth daily. Metoprolol Succinate  MG Oral Tablet 24 Hr Take 1 tablet (200 mg total) by mouth daily. 90 tablet 1    IMBRUVICA 420 MG Oral Tab       Clobetasol Propionate 0.05 % External Cream APPLY TO AFFECTED AREA TWICE A DAY AS NEEDED  2    metroNIDAZOLE 0.75 % External Gel Use bid to nose for rosacea 60 g 11    TADALAFIL OR Take 5 mg by mouth daily. clotrimazole-betamethasone 1-0.05 % External Cream APPLY TO AFFECTED AREA TWICE DAILY 45 g 3    tamsulosin HCl (FLOMAX) 0.4 MG Oral Cap 1 capsule (0.4 mg total) daily. finasteride (PROSCAR) 5 MG Oral Tab Take 1 tablet (5 mg total) by mouth daily. No current facility-administered medications on file prior to visit. Allergies: Allergies   Allergen Reactions    Bactrim [Sulfamethoxazole W/Trimethoprim] OTHER (SEE COMMENTS)     Neutropenia       SOCIAL HISTORY:    Social History     Tobacco Use    Smoking status: Former     Packs/day: 1.00     Years: 33.00     Additional pack years: 0.00     Total pack years: 33.00     Types: Cigarettes     Start date: 1970     Quit date: 2015     Years since quittin.4    Smokeless tobacco: Never    Tobacco comments:     quit on and off for 10 yrs.  to , smoked 0.5-1 pk a day   Substance Use Topics    Alcohol use: Yes     Alcohol/week: 0.0 standard drinks of alcohol     Comment: (Beer) 6 pk of beer, rarely. FAMILY HISTORY: Denies known family history of hearing loss, tinnitus, vertigo, or migraine. Denies known family history of head and neck cancer, thyroid cancer, bleeding disorders. REVIEW OF SYSTEMS:   Positives are in bold  Neuro: Headache, facial weakness, facial numbness, neck pain, vertigo  ENT: Hearing change, tinnitus, otorrhea, otalgia, aural fullness, ear pressure, vertigo, imbalance  Sinus pressure, rhinorrhea, congestion, facial pain, jaw pain, dysphagia, odynophagia, sore throat, voice changes, shortness of breath    EXAMINATION:  I washed my hands with an alcohol-based hand gel prior to examination  Constitutional:   --Vitals: There were no vitals taken for this visit.   General: no apparent distress, well-developed, conversant  Psych: affect pleasant and appropriate for age, alert and oriented  Respiratory: No stridor, stertor or increased work of breathing  ENT:  --Nose: no external nasal deformity, anterior rhinoscopy: Septum midline, +inferior turbinate hypertrophy, mucosa dry erythematous, mucus rhinorrhea  --Neck: No palpable cervical lymphadenopathy, no thyromegaly, no masses or lesions over the bilateral submandibular or parotid glands  --Ear: (bilateral ears were examined under binocular microscopy)  Right ear microscopic exam:  Pinna: Normal, no lesions or masses. Mastoid: Nontender on palpation. External auditory canal: Clear, no masses or lesions. Tympanic membrane: Intact, no lesions, normal landmarks. Middle ear: Aerated. Left ear microscopic exam:  Pinna: Normal, no lesions or masses. Mastoid: Nontender on palpation. External auditory canal: Clear, no masses or lesions. Tympanic membrane: Intact, no lesions, normal landmarks. Middle ear: Aerated. Nasal endoscopy (date 11/15/2023)  Verbal consent obtained, patient was correctly identified  Topical anesthesia with aerosolized lidocaine and neosynepherine spray in the bilateral nares  Findings:   Right: Thick mucous throughout. Edematous mucosa. Inferior turbinate is hypertrophic. Middle meatus is with purulence and without masses. Middle turbinate is well formed and normal appearing. Sphenoethmoid recess is with purulence and without polyps, masses. Left: Thick mucous throughout. Edematous mucosa. Inferior turbinate is hypertrophic. Middle meatus is with purulence and without masses. Middle turbinate is well formed and normal appearing. Sphenoethmoid recess is with purulence and without polyps, masses. Septum: largely midline  Nasopharynx: Purulence draining into the nasopharynx. No masses, bilateral eustachian tubes are patent. The bilateral fossae of Rosenmueller are clear. ASSESSMENT/PLAN:  Tucker Calvillo is a 79year old male with     ICD-10-CM   1. Chronic pansinusitis  J32.4   2. Acute recurrent maxillary sinusitis  J01.01   3. Acute recurrent ethmoidal sinusitis  J01.21   4.  Acute recurrent sphenoidal sinusitis  J01.31        PLAN:  -Culture taken from the right middle meatus  -Will call patient with culture results to recommend appropriate antibiotics as patient is immunocompromised and has not responded to a long course of Augmentin  -Continue flonase nasal spray, 2 sprays daily to each nostril, may take up to 6 weeks weeks of consistent use to take effect. Proper application discussed.   -Consider nasal saline irrigations    Situation reviewed with the patient in detail. Attention: This note has been scribed by Garland Barton under the supervision of Priyanka Traore MD.    Priyanka Traore MD  Otology/Otolaryngology  [facility]   [address] [facility] [place] [address]  Phone [phone]  Fax [phone]

## 2023-11-20 ENCOUNTER — TELEPHONE (OUTPATIENT)
Dept: OTOLARYNGOLOGY | Facility: CLINIC | Age: 68
End: 2023-11-20

## 2023-11-20 RX ORDER — AZITHROMYCIN 500 MG/1
500 TABLET, FILM COATED ORAL DAILY
Qty: 14 TABLET | Refills: 0 | Status: SHIPPED | OUTPATIENT
Start: 2023-11-20 | End: 2023-12-04

## 2023-11-20 NOTE — TELEPHONE ENCOUNTER
Per pt asking to speak to Dr. Sheryl Wilson, states he has been waiting for days with no response.  Please advise

## 2023-11-20 NOTE — TELEPHONE ENCOUNTER
AEROBIC CULTURE RESULT Mixture of organisms suggestive of normal respiratory jacob      4+ growth Moraxella catarrhalis Abnormal    Beta lactamase positive            AEROBIC SMEAR 2+ WBCs seen      2+ Gram Positive Cocci           Patient is allergic to Bactrim. Did not improve with 20 day course of augmentin. Patient cannot take clarithromycin due to imbruvica. Will recommend 14 day course of azithromycin. Could also consider course of 3rd or 4th gen cephalosporin. Follow-up in 2 weeks     Shaji Zuleta MD  Otology/Otolaryngology  [facility]   [address] [facility]  Phone [phone]  Fax [phone]

## 2023-11-20 NOTE — TELEPHONE ENCOUNTER
Fax received from [employer] Message #[id number] date November 18th  Pt had test taken and no one has called him back with antibiotic prescription.

## 2023-12-28 ENCOUNTER — OFFICE VISIT (OUTPATIENT)
Dept: OTOLARYNGOLOGY | Facility: CLINIC | Age: 68
End: 2023-12-28

## 2023-12-28 DIAGNOSIS — H66.90 ACUTE OTITIS MEDIA, UNSPECIFIED OTITIS MEDIA TYPE: ICD-10-CM

## 2023-12-28 DIAGNOSIS — H60.501 ACUTE OTITIS EXTERNA OF RIGHT EAR, UNSPECIFIED TYPE: Primary | ICD-10-CM

## 2023-12-28 RX ORDER — SPIRONOLACTONE 25 MG/1
25 TABLET ORAL DAILY
COMMUNITY
Start: 2023-10-26

## 2023-12-28 RX ORDER — LOSARTAN POTASSIUM 50 MG/1
50 TABLET ORAL DAILY
COMMUNITY
Start: 2023-12-22

## 2023-12-28 RX ORDER — CIPROFLOXACIN 500 MG/1
500 TABLET, FILM COATED ORAL EVERY 12 HOURS
Qty: 14 TABLET | Refills: 0 | Status: SHIPPED | OUTPATIENT
Start: 2023-12-28 | End: 2024-01-04

## 2023-12-28 NOTE — PROGRESS NOTES
Yoni Edwards is a 79year old male. Chief Complaint   Patient presents with    Ear Pain     Patient is here due to ear pain on right ear x 4 days. Reports drainage. ASSESSMENT AND PLAN:   1. Acute otitis externa of right ear, unspecified type  79year-old with somewhat of a complicated medical history. Has a history of a sinus procedure he states by Dr. Erica Hines in the past.  He was treated for sinusitis by Dr. Fanny Vora a couple months ago. He has a history of immunosuppression with leukemia. He gets ear and sinus infections often. He has been having ear pain now for 4 days on the right. He states it started when he may have picked some skin out of his ear    On exam his right ear canal is erythematous with clear purulence in the medial ear canal that was debrided. There is a small area of granulation tissue in the anterior-inferior quadrant possibly overlying a rupture site. The tympanic membrane itself was very erythematous and thickened. He denies any sinus symptoms today although is having right ear symptoms consistent with a possible otitis media with rupture and otitis externa. He has some ofloxacin and TobraDex drops already at home which are not  instructed him to use these 2 times a day for the next week. Will also prescribe oral ciprofloxacin especially given his immunosuppression for 7 days. He is to return in 1 to 2 weeks if still not improving. 2. Acute otitis media, unspecified otitis media type        The patient indicates understanding of these issues and agrees to the plan. EXAM:   There were no vitals taken for this visit.     Pertinent exam findings may also be noted above in assessment and plan     System Details   Skin Inspection - Normal.   Constitutional Overall appearance - Normal.   Head/Face Symmetric, TMJ tenderness not present    Eyes EOMI, PERRL   Right ear:  Canal clear, TM intact, no KAITLIN   Left ear:  Canal clear, TM intact, no KAITLIN   Nose: Septum midline, inferior turbinates not enlarged, nasal valves without collapse    Oral cavity/Oropharynx: No lesions or masses on inspection or palpation, tonsils symmetric    Neck: Soft without LAD, thyroid not enlarged  Voice clear/ no stridor   Other:      Scopes and Procedures:   Canals:  Right: Canal with cerumen preventing adequate view of TM, debrided with instrumentation    Tympanic Membranes:  Right: Normal tympanic membrane. TM Visualized Method:   Right TM examined via otomicroscopy. PROCEDURE:   Removal of cerumen impaction   The cerumen impaction was completely removed on the right side using microscopy as necessary. Removal was completed by using a curette and suction. Current Outpatient Medications   Medication Sig Dispense Refill    spironolactone 25 MG Oral Tab Take 1 tablet (25 mg total) by mouth daily. losartan 50 MG Oral Tab Take 1 tablet (50 mg total) by mouth daily. ciprofloxacin 500 MG Oral Tab Take 1 tablet (500 mg total) by mouth every 12 (twelve) hours for 7 days. 14 tablet 0    pseudoephedrine  MG Oral Tablet 12 Hr Take 1 tablet (120 mg total) by mouth every 12 (twelve) hours. 60 tablet 3    ELIQUIS 5 MG Oral Tab Take 1 tablet (5 mg total) by mouth 2 (two) times daily. levocetirizine 5 MG Oral Tab Take 1 tablet (5 mg total) by mouth every evening. 30 tablet 11    MONTELUKAST 10 MG Oral Tab TAKE 1 TABLET BY MOUTH EVERY DAY AT NIGHT 90 tablet 1    allopurinol 300 MG Oral Tab       pseudoephedrine  MG Oral Tablet 12 Hr Take 1 tablet (120 mg total) by mouth every 12 (twelve) hours. 60 tablet 3    fluticasone propionate 50 MCG/ACT Nasal Suspension 2 sprays by Nasal route daily. 3 each 4    acyclovir 400 MG Oral Tab Take 1 tablet (400 mg total) by mouth daily. Metoprolol Succinate  MG Oral Tablet 24 Hr Take 1 tablet (200 mg total) by mouth daily.  90 tablet 1    IMBRUVICA 420 MG Oral Tab       Clobetasol Propionate 0.05 % External Cream APPLY TO AFFECTED AREA TWICE A DAY AS NEEDED  2    metroNIDAZOLE 0.75 % External Gel Use bid to nose for rosacea 60 g 11    TADALAFIL OR Take 5 mg by mouth daily. clotrimazole-betamethasone 1-0.05 % External Cream APPLY TO AFFECTED AREA TWICE DAILY 45 g 3    tamsulosin HCl (FLOMAX) 0.4 MG Oral Cap 1 capsule (0.4 mg total) daily. finasteride (PROSCAR) 5 MG Oral Tab Take 1 tablet (5 mg total) by mouth daily. ofloxacin 0.3 % Otic Solution Place 3 drops in ear(s) in the morning, at noon, and at bedtime. (Patient not taking: Reported on 2023) 10 mL 0    tobramycin-dexamethasone 0.3-0.1 % Ophthalmic Suspension Place 3 drops in ear(s) every 8 (eight) hours. (Patient not taking: Reported on 2023) 10 mL 0    methylPREDNISolone 4 MG Oral Tablet Therapy Pack Take by oral route. (Patient not taking: Reported on 2023) 1 each 0    lisinopril 5 MG Oral Tab Take 2 tablets (10 mg total) by mouth daily. (Patient not taking: Reported on 2023)        Past Medical History:   Diagnosis Date    Arthritis     Bipolar affective ([place])     BPH (benign prostatic hyperplasia)     Leukemia ([place])     Leukemia ([place])     Lipid screening 2009    Per [facility]    Lymphoma ([place]) 2000    Management:  medication    Psoriasis     Sinusitis     Sleep apnea 16-Split    AHI 31 RDI 34 REM AHI 36 Supine AHI 98 non-supine AHI 17 Sao2 Nithin 81%/CPAP-8cwp/[facility]      Social History:  Social History     Socioeconomic History    Marital status:    Tobacco Use    Smoking status: Former     Packs/day: 1.00     Years: 33.00     Additional pack years: 0.00     Total pack years: 33.00     Types: Cigarettes     Start date: 1970     Quit date: 2015     Years since quittin.5    Smokeless tobacco: Never    Tobacco comments:     quit on and off for 10 yrs. prioir to 2015, smoked 0.5-1 pk a day   Vaping Use    Vaping Use: Never used   Substance and Sexual Activity    Alcohol use:  Yes     Alcohol/week: 0.0 standard drinks of alcohol     Comment: (Beer) 6 pk of beer, rarely. Drug use: No   Other Topics Concern    Caffeine Concern Yes     Comment: Coffee, 1 cup daily.     Reaction to local anesthetic No          Katie Rashid MD  12/28/2023  3:25 PM

## 2024-01-12 RX ORDER — MONTELUKAST SODIUM 10 MG/1
TABLET ORAL
Qty: 90 TABLET | Refills: 1 | Status: SHIPPED | OUTPATIENT
Start: 2024-01-12

## 2024-07-05 RX ORDER — MONTELUKAST SODIUM 10 MG/1
TABLET ORAL
Qty: 90 TABLET | Refills: 0 | Status: SHIPPED | OUTPATIENT
Start: 2024-07-05

## 2024-07-05 RX ORDER — LEVOCETIRIZINE DIHYDROCHLORIDE 5 MG/1
5 TABLET, FILM COATED ORAL EVERY EVENING
Qty: 90 TABLET | Refills: 0 | Status: SHIPPED | OUTPATIENT
Start: 2024-07-05

## 2024-07-05 NOTE — TELEPHONE ENCOUNTER
Refill request sent to pharmacy on 7/5/24 for Levocetirizine and Montelukast , last OV on 12/28/24

## 2024-08-19 ENCOUNTER — OFFICE VISIT (OUTPATIENT)
Dept: OTOLARYNGOLOGY | Facility: CLINIC | Age: 69
End: 2024-08-19

## 2024-08-19 DIAGNOSIS — H66.90 ACUTE OTITIS MEDIA, UNSPECIFIED OTITIS MEDIA TYPE: Primary | ICD-10-CM

## 2024-08-19 DIAGNOSIS — H69.90 EUSTACHIAN TUBE DISORDER, UNSPECIFIED LATERALITY: ICD-10-CM

## 2024-08-19 RX ORDER — TOBRAMYCIN AND DEXAMETHASONE 3; 1 MG/ML; MG/ML
SUSPENSION/ DROPS OPHTHALMIC
Qty: 10 ML | Refills: 0 | Status: SHIPPED | OUTPATIENT
Start: 2024-08-19

## 2024-08-19 RX ORDER — AMOXICILLIN AND CLAVULANATE POTASSIUM 875; 125 MG/1; MG/1
1 TABLET, FILM COATED ORAL EVERY 12 HOURS
Qty: 20 TABLET | Refills: 0 | Status: SHIPPED | OUTPATIENT
Start: 2024-08-19 | End: 2024-08-29

## 2024-08-19 NOTE — PROGRESS NOTES
Bernabe Osuna is a 68 year old male.   Chief Complaint   Patient presents with    Ear Problem     C/o left ear draining X 1 week         HPI:   68-year-old presents with hearing loss and drainage from his left ear.  Reports a chronic history of ear issues about once every 6 months he gets drainage from his ears.    Current Outpatient Medications   Medication Sig Dispense Refill    tobramycin-dexamethasone 0.3-0.1 % Ophthalmic Suspension Apply 5 drops twice a day to both ears for 7 days. 10 mL 0    amoxicillin clavulanate 875-125 MG Oral Tab Take 1 tablet by mouth every 12 (twelve) hours for 10 days. 20 tablet 0    levocetirizine 5 MG Oral Tab TAKE 1 TABLET BY MOUTH EVERY DAY IN THE EVENING 90 tablet 0    montelukast 10 MG Oral Tab TAKE 1 TABLET BY MOUTH EVERY DAY AT NIGHT 90 tablet 0    spironolactone 25 MG Oral Tab Take 1 tablet (25 mg total) by mouth daily.      losartan 50 MG Oral Tab Take 1 tablet (50 mg total) by mouth daily.      pseudoephedrine  MG Oral Tablet 12 Hr Take 1 tablet (120 mg total) by mouth every 12 (twelve) hours. 60 tablet 3    ELIQUIS 5 MG Oral Tab Take 1 tablet (5 mg total) by mouth 2 (two) times daily.      allopurinol 300 MG Oral Tab       pseudoephedrine  MG Oral Tablet 12 Hr Take 1 tablet (120 mg total) by mouth every 12 (twelve) hours. 60 tablet 3    fluticasone propionate 50 MCG/ACT Nasal Suspension 2 sprays by Nasal route daily. 3 each 4    acyclovir 400 MG Oral Tab Take 1 tablet (400 mg total) by mouth daily.      Metoprolol Succinate  MG Oral Tablet 24 Hr Take 1 tablet (200 mg total) by mouth daily. 90 tablet 1    IMBRUVICA 420 MG Oral Tab       Clobetasol Propionate 0.05 % External Cream APPLY TO AFFECTED AREA TWICE A DAY AS NEEDED  2    metroNIDAZOLE 0.75 % External Gel Use bid to nose for rosacea 60 g 11    TADALAFIL OR Take 5 mg by mouth daily.      clotrimazole-betamethasone 1-0.05 % External Cream APPLY TO AFFECTED AREA TWICE DAILY 45 g 3    tamsulosin HCl  (FLOMAX) 0.4 MG Oral Cap 1 capsule (0.4 mg total) daily.      finasteride (PROSCAR) 5 MG Oral Tab Take 1 tablet (5 mg total) by mouth daily.      ofloxacin 0.3 % Otic Solution Place 3 drops in ear(s) in the morning, at noon, and at bedtime. (Patient not taking: Reported on 2023) 10 mL 0    tobramycin-dexamethasone 0.3-0.1 % Ophthalmic Suspension Place 3 drops in ear(s) every 8 (eight) hours. (Patient not taking: Reported on 2023) 10 mL 0    methylPREDNISolone 4 MG Oral Tablet Therapy Pack Take by oral route. (Patient not taking: Reported on 2023) 1 each 0    lisinopril 5 MG Oral Tab Take 2 tablets (10 mg total) by mouth daily. (Patient not taking: Reported on 2023)        Past Medical History:    Arthritis    Bipolar affective ([facility])    BPH (benign prostatic hyperplasia)    Leukemia ([facility])    Leukemia ([facility])    Lipid screening    Per [facility]    Lymphoma ([facility])    Management:  medication    Psoriasis    Sinusitis    Sleep apnea    AHI 31 RDI 34 REM AHI 36 Supine AHI 98 non-supine AHI 17 Sao2 Nithin 81%/CPAP-8cwp/[facility]      Social History:  Social History     Socioeconomic History    Marital status:    Tobacco Use    Smoking status: Former     Current packs/day: 0.00     Average packs/day: 1 pack/day for 44.6 years (44.6 ttl pk-yrs)     Types: Cigarettes     Start date: 1970     Quit date: 2015     Years since quittin.2    Smokeless tobacco: Never    Tobacco comments:     quit on and off for 10 yrs.  to , smoked 0.5-1 pk a day   Vaping Use    Vaping status: Never Used   Substance and Sexual Activity    Alcohol use: Yes     Alcohol/week: 0.0 standard drinks of alcohol     Comment: (Beer) 6 pk of beer, rarely.    Drug use: No   Other Topics Concern    Caffeine Concern Yes     Comment: Coffee, 1 cup daily.    Reaction to local anesthetic No     Social Determinants of Health     Food Insecurity: Low Risk  (11/10/2022)    Received from [facility],  [facility]    Food Insecurity     Have there been times that your food ran out, and you didn't have money to get more?: No     Are there times that you worry that this might happen?: No   Transportation Needs: Low Risk  (11/10/2022)    Received from [facility]    Transportation Needs     Do you have trouble getting transportation to medical appointments?: No   Housing Stability: Low Risk  (11/10/2022)    Received from [facility], [facility]    Housing Stability     Are you concerned about having a safe and reliable place to live?: No      Past Surgical History:   Procedure Laterality Date    Other surgical history  2000    Nose surgery         EXAM:   There were no vitals taken for this visit.    System Details   Skin Inspection - Normal.   Constitutional Overall appearance - Normal.   Head/Face Symmetric, TMJ tenderness not present    Eyes EOMI, PERRL   Right ear:  Canal with thin otorrhea and a thickened tympanic membrane   Left ear:  Canal with thin otorrhea that is yellowish and a thickened and inflamed tympanic membrane   Nose: Septum midline, inferior turbinates not enlarged, nasal valves without collapse    Oral cavity/Oropharynx: No lesions or masses on inspection or palpation, tonsils symmetric    Neck: Soft without LAD, thyroid not enlarged  Voice clear/ no stridor   Other:      SCOPES AND PROCEDURES:     Canals:  Left: Canal with cerumen preventing adequate view of TM, debrided with instrumentation    Tympanic Membranes:  Left: Normal tympanic membrane.     TM Visualized Method:   Left TM examined via otomicroscopy.      PROCEDURE:   Removal of cerumen impaction   The cerumen impaction was completely removed on the left side using microscopy as necessary.   Removal was completed by using a curette and suction.     AUDIOGRAM AND IMAGING:         IMPRESSION:   1. Acute otitis media,  unspecified otitis media type    2. Eustachian tube disorder, unspecified laterality       Recommendations:  -Appears to have an otitis media or middle ear effusion on the left side with a small drainage hole as when he auto insufflate there is a squeaking noise on the left  -Will begin him on otic drops and oral antibiotics given his immunosuppression  -Discussed return precautions    The patient indicates understanding of these issues and agrees to the plan.      Chano Mendoza MD  8/19/2024  4:04 PM

## 2024-08-26 RX ORDER — LEVOCETIRIZINE DIHYDROCHLORIDE 5 MG/1
5 TABLET, FILM COATED ORAL EVERY EVENING
Qty: 90 TABLET | Refills: 0 | Status: SHIPPED | OUTPATIENT
Start: 2024-08-26

## 2024-08-26 RX ORDER — MONTELUKAST SODIUM 10 MG/1
TABLET ORAL
Qty: 90 TABLET | Refills: 0 | Status: SHIPPED | OUTPATIENT
Start: 2024-08-26

## 2024-08-26 NOTE — TELEPHONE ENCOUNTER
Refill request sent to pharmacy on 8/26/24 for Montelukast and Levocetirizine, last OV on  8/19/24

## 2024-09-03 ENCOUNTER — OFFICE VISIT (OUTPATIENT)
Dept: OTOLARYNGOLOGY | Facility: CLINIC | Age: 69
End: 2024-09-03

## 2024-09-03 DIAGNOSIS — H69.90 EUSTACHIAN TUBE DISORDER, UNSPECIFIED LATERALITY: ICD-10-CM

## 2024-09-03 DIAGNOSIS — J01.90 ACUTE SINUSITIS, RECURRENCE NOT SPECIFIED, UNSPECIFIED LOCATION: Primary | ICD-10-CM

## 2024-09-03 DIAGNOSIS — H60.501 ACUTE OTITIS EXTERNA OF RIGHT EAR, UNSPECIFIED TYPE: ICD-10-CM

## 2024-09-03 PROCEDURE — 99213 OFFICE O/P EST LOW 20 MIN: CPT | Performed by: STUDENT IN AN ORGANIZED HEALTH CARE EDUCATION/TRAINING PROGRAM

## 2024-09-03 PROCEDURE — 92504 EAR MICROSCOPY EXAMINATION: CPT | Performed by: STUDENT IN AN ORGANIZED HEALTH CARE EDUCATION/TRAINING PROGRAM

## 2024-09-03 NOTE — PROGRESS NOTES
Bernabe Osuna is a 68 year old male.   Chief Complaint   Patient presents with    Follow - Up     Reevaluation on otitis media  Reports sore throat X 2 days      HPI:   68-year-old in follow-up regarding his otitis externa on the right and otitis media on the left.  Overall his left ear is feeling much better although he is starting to have symptoms of a sinusitis.  He is on immunosuppression    Current Outpatient Medications   Medication Sig Dispense Refill    amoxicillin clavulanate 875-125 MG Oral Tab Take 1 tablet by mouth every 12 (twelve) hours for 14 days. 28 tablet 0    MONTELUKAST 10 MG Oral Tab TAKE 1 TABLET BY MOUTH EVERY DAY AT NIGHT 90 tablet 0    LEVOCETIRIZINE 5 MG Oral Tab TAKE 1 TABLET BY MOUTH EVERY DAY IN THE EVENING 90 tablet 0    tobramycin-dexamethasone 0.3-0.1 % Ophthalmic Suspension Apply 5 drops twice a day to both ears for 7 days. 10 mL 0    spironolactone 25 MG Oral Tab Take 1 tablet (25 mg total) by mouth daily.      losartan 50 MG Oral Tab Take 1 tablet (50 mg total) by mouth daily.      pseudoephedrine  MG Oral Tablet 12 Hr Take 1 tablet (120 mg total) by mouth every 12 (twelve) hours. 60 tablet 3    ELIQUIS 5 MG Oral Tab Take 1 tablet (5 mg total) by mouth 2 (two) times daily.      allopurinol 300 MG Oral Tab       pseudoephedrine  MG Oral Tablet 12 Hr Take 1 tablet (120 mg total) by mouth every 12 (twelve) hours. 60 tablet 3    fluticasone propionate 50 MCG/ACT Nasal Suspension 2 sprays by Nasal route daily. 3 each 4    acyclovir 400 MG Oral Tab Take 1 tablet (400 mg total) by mouth daily.      Metoprolol Succinate  MG Oral Tablet 24 Hr Take 1 tablet (200 mg total) by mouth daily. 90 tablet 1    IMBRUVICA 420 MG Oral Tab       Clobetasol Propionate 0.05 % External Cream APPLY TO AFFECTED AREA TWICE A DAY AS NEEDED  2    metroNIDAZOLE 0.75 % External Gel Use bid to nose for rosacea 60 g 11    TADALAFIL OR Take 5 mg by mouth daily.      clotrimazole-betamethasone  1-0.05 % External Cream APPLY TO AFFECTED AREA TWICE DAILY 45 g 3    tamsulosin HCl (FLOMAX) 0.4 MG Oral Cap 1 capsule (0.4 mg total) daily.      finasteride (PROSCAR) 5 MG Oral Tab Take 1 tablet (5 mg total) by mouth daily.      ofloxacin 0.3 % Otic Solution Place 3 drops in ear(s) in the morning, at noon, and at bedtime. (Patient not taking: Reported on 2023) 10 mL 0    tobramycin-dexamethasone 0.3-0.1 % Ophthalmic Suspension Place 3 drops in ear(s) every 8 (eight) hours. (Patient not taking: Reported on 2023) 10 mL 0    methylPREDNISolone 4 MG Oral Tablet Therapy Pack Take by oral route. (Patient not taking: Reported on 2023) 1 each 0    lisinopril 5 MG Oral Tab Take 2 tablets (10 mg total) by mouth daily. (Patient not taking: Reported on 2023)        Past Medical History:    Arthritis    Bipolar affective (HCC)    BPH (benign prostatic hyperplasia)    Leukemia (HCC)    Leukemia (HCC)    Lipid screening    Per [employer]    Lymphoma (HCC)    Management:  medication    Psoriasis    Sinusitis    Sleep apnea    AHI 31 RDI 34 REM AHI 36 Supine AHI 98 non-supine AHI 17 Sao2 Nithin 81%/CPAP-8cwp/[facility]      Social History:  Social History     Socioeconomic History    Marital status:    Tobacco Use    Smoking status: Former     Current packs/day: 0.00     Average packs/day: 1 pack/day for 44.6 years (44.6 ttl pk-yrs)     Types: Cigarettes     Start date: 1970     Quit date: 2015     Years since quittin.2    Smokeless tobacco: Never    Tobacco comments:     quit on and off for 10 yrs. prioir to , smoked 0.5-1 pk a day   Vaping Use    Vaping status: Never Used   Substance and Sexual Activity    Alcohol use: Yes     Alcohol/week: 0.0 standard drinks of alcohol     Comment: (Beer) 6 pk of beer, rarely.    Drug use: No   Other Topics Concern    Caffeine Concern Yes     Comment: Coffee, 1 cup daily.    Reaction to local anesthetic No     Social Determinants of Health     Food  Insecurity: Low Risk  (11/10/2022)    Received from [facility]    Food Insecurity     Have there been times that your food ran out, and you didn't have money to get more?: No     Are there times that you worry that this might happen?: No   Transportation Needs: Low Risk  (11/10/2022)    Received from [facility]    Transportation Needs     Do you have trouble getting transportation to medical appointments?: No   Housing Stability: Low Risk  (11/10/2022)    Received from [facility]    Housing Stability     Are you concerned about having a safe and reliable place to live?: No      Past Surgical History:   Procedure Laterality Date    Other surgical history  2000    Nose surgery         EXAM:   There were no vitals taken for this visit.    System Details   Skin Inspection - Normal.   Constitutional Overall appearance - Normal.   Head/Face Symmetric, TMJ tenderness not present    Eyes EOMI, PERRL   Right ear:  Canal clear, TM erythematous     Left ear:  Canal clear, TM intact, no KAITLIN  Much improved appears back to baseline   Nose: Septum midline, inferior turbinates not enlarged, nasal valves without collapse    Oral cavity/Oropharynx: No lesions or masses on inspection or palpation, tonsils symmetric    Neck: Soft without LAD, thyroid not enlarged  Voice clear/ no stridor   Other:      SCOPES AND PROCEDURES:         AUDIOGRAM AND IMAGING:         IMPRESSION:   1. Acute sinusitis, recurrence not specified, unspecified location    2. Acute otitis externa of right ear, unspecified type    3. Eustachian tube disorder, unspecified laterality       Recommendations:  -Left ear is much improved and back to baseline.  Right ear still with erythema although reports chronic right-sided otitis and eustachian tube dysfunction.  Will continue the otic drops to the  right ear  -Will refill his Augmentin given his new onset of sinusitis and history of immunosuppression and discussed return precautions    The patient indicates understanding of these issues and agrees to the plan.      Chano Mendoza MD  9/3/2024  11:16 AM

## 2024-12-18 RX ORDER — MONTELUKAST SODIUM 10 MG/1
TABLET ORAL
Qty: 90 TABLET | Refills: 0 | Status: SHIPPED | OUTPATIENT
Start: 2024-12-18

## 2024-12-18 RX ORDER — LEVOCETIRIZINE DIHYDROCHLORIDE 5 MG/1
5 TABLET, FILM COATED ORAL EVERY EVENING
Qty: 90 TABLET | Refills: 0 | Status: SHIPPED | OUTPATIENT
Start: 2024-12-18

## 2024-12-18 NOTE — TELEPHONE ENCOUNTER
Refill request sent to pharmacy on 12/18/24 for Levocetirizine and Montelukast, last Office visit on 9/3/24

## 2025-03-17 RX ORDER — MONTELUKAST SODIUM 10 MG/1
TABLET ORAL
Qty: 90 TABLET | Refills: 0 | OUTPATIENT
Start: 2025-03-17

## 2025-03-17 RX ORDER — LEVOCETIRIZINE DIHYDROCHLORIDE 5 MG/1
5 TABLET, FILM COATED ORAL EVERY EVENING
Qty: 90 TABLET | Refills: 0 | OUTPATIENT
Start: 2025-03-17

## 2025-03-17 NOTE — TELEPHONE ENCOUNTER
Medication refill request for Montelukast and Levocetirizine  Last office visit with Dr. Urias:  10/14/23  Will need an appt with Dr. Urias

## 2025-04-29 NOTE — TELEPHONE ENCOUNTER
Pt LOV on 7/24/20 otitis media/oral ulcers request for refill,please advise. labs, discuss case with  toxicology, dispo as per tox. none

## 2025-06-02 RX ORDER — LEVOCETIRIZINE DIHYDROCHLORIDE 5 MG/1
5 TABLET, FILM COATED ORAL EVERY EVENING
Qty: 90 TABLET | Refills: 0 | OUTPATIENT
Start: 2025-06-02

## 2025-06-03 RX ORDER — LEVOCETIRIZINE DIHYDROCHLORIDE 5 MG/1
5 TABLET, FILM COATED ORAL EVERY EVENING
Qty: 90 TABLET | Refills: 0 | OUTPATIENT
Start: 2025-06-03

## 2025-06-03 NOTE — TELEPHONE ENCOUNTER
Rx request denied, LOV was 10/14/23 with Dr. Urias the last physician to order this medication for patient.  [employer] message sent to patient.

## 2025-08-13 RX ORDER — MONTELUKAST SODIUM 10 MG/1
10 TABLET ORAL NIGHTLY
Qty: 90 TABLET | Refills: 0 | Status: SHIPPED | OUTPATIENT
Start: 2025-08-13

## (undated) NOTE — ED AVS SNAPSHOT
Azeem Nicole   MRN: [id number]    Department:  [facility] Emergency Department   Date of Visit:  2/6/2018           Disclosure     Insurance plans vary and the physician(s) referred by the ER may not be covered by your plan.  Please contact y CARE PHYSICIAN AT ONCE OR RETURN IMMEDIATELY TO THE EMERGENCY DEPARTMENT. If you have been prescribed any medication(s), please fill your prescription right away and begin taking the medication(s) as directed.   If you believe that any of the medications

## (undated) NOTE — LETTER
07/19/18        [place] 50      Dear Macey Chung,    [facility] records indicate that you have outstanding lab work and or testing that was ordered for you and has not yet been completed:          CBC W Differential W Platelet

## (undated) NOTE — MR AVS SNAPSHOT
[address]  [phone]               Thank you for choosing us for your health care visit with Ac Snowden MD.  We are glad to serve you and happy to provide you with this summar will look at your ear, nose, and throat. You usually won't need to have X-rays taken.    The doctor may take a sample of mucus to check for bacteria. If you have sinusitis that keeps coming back, you may need imaging tests such as X-rays or CAT scans.  This Apply to nares bid for 14 days   Commonly known as:  BACTROBAN           tamsulosin HCl 0.4 MG Caps   Commonly known as:  FLOMAX           Testosterone 50 MG/5GM (1%) Gel   Place onto the skin.                 Where to Get Your Medications      These medica

## (undated) NOTE — MR AVS SNAPSHOT
[address]  [phone]               Thank you for choosing us for your health care visit with Moose Multani.  Carlos Naqvi MD.  We are glad to serve you and happy to provide you with this summar Montelukast Sodium 10 MG Tabs   Take 1 tablet (10 mg total) by mouth nightly.    Commonly known as:  SINGULAIR           mupirocin 2 % Oint   Apply to nares bid for 14 days   Commonly known as:  BACTROBAN           tamsulosin HCl 0.4 MG Caps   Commonly Don’t eat while distracted and slow down. Avoid over sized portions. Don’t eat while when you’re bored.      EAT THESE FOODS MORE OFTEN: EAT THESE FOODS LESS OFTEN:   Make half your plate fruits and vegetables Highly refined, white starches including wh

## (undated) NOTE — Clinical Note
I had the pleasure of seeing Adrianna Schafer on 9/26/2022. Please see my attached note.     Jenna Soto MD FACS  [facility]

## (undated) NOTE — ED AVS SNAPSHOT
Madhavi Lobo   MRN: [id number]    Department:  [facility] Emergency Department   Date of Visit:  11/8/2018           Disclosure     Insurance plans vary and the physician(s) referred by the ER may not be covered by your plan.  Please contact within the next three months to obtain basic health screening including reassessment of your blood pressure.     IF THERE IS ANY CHANGE OR WORSENING OF YOUR CONDITION, CALL YOUR PRIMARY CARE PHYSICIAN AT ONCE OR RETURN IMMEDIATELY TO THE EMERGENCY DEPARTMEN